# Patient Record
Sex: MALE | Race: WHITE | NOT HISPANIC OR LATINO | Employment: OTHER | URBAN - METROPOLITAN AREA
[De-identification: names, ages, dates, MRNs, and addresses within clinical notes are randomized per-mention and may not be internally consistent; named-entity substitution may affect disease eponyms.]

---

## 2019-06-21 ENCOUNTER — HOSPITAL ENCOUNTER (EMERGENCY)
Facility: HOSPITAL | Age: 79
End: 2019-06-21

## 2022-10-15 ENCOUNTER — HOSPITAL ENCOUNTER (OUTPATIENT)
Facility: HOSPITAL | Age: 82
Setting detail: OBSERVATION
Discharge: HOME | End: 2022-10-18
Attending: EMERGENCY MEDICINE | Admitting: INTERNAL MEDICINE

## 2022-10-15 ENCOUNTER — APPOINTMENT (OUTPATIENT)
Dept: CT IMAGING | Facility: HOSPITAL | Age: 82
End: 2022-10-15

## 2022-10-15 DIAGNOSIS — R77.8 ELEVATED TROPONIN: ICD-10-CM

## 2022-10-15 DIAGNOSIS — R61 DIAPHORESIS: ICD-10-CM

## 2022-10-15 DIAGNOSIS — E87.20 LACTIC ACIDOSIS: ICD-10-CM

## 2022-10-15 DIAGNOSIS — R11.2 NAUSEA AND VOMITING, UNSPECIFIED VOMITING TYPE: Primary | ICD-10-CM

## 2022-10-15 DIAGNOSIS — R10.9 FLANK PAIN: ICD-10-CM

## 2022-10-15 PROBLEM — E11.9 TYPE 2 DIABETES MELLITUS: Status: ACTIVE | Noted: 2022-10-15

## 2022-10-15 PROBLEM — I10 ESSENTIAL HYPERTENSION: Status: ACTIVE | Noted: 2022-10-15

## 2022-10-15 PROBLEM — E78.5 HYPERLIPIDEMIA: Status: ACTIVE | Noted: 2022-10-15

## 2022-10-15 LAB
ALBUMIN SERPL-MCNC: 4.3 G/DL (ref 3.5–5.2)
ALBUMIN/GLOB SERPL: 1.7 G/DL
ALP SERPL-CCNC: 108 U/L (ref 39–117)
ALT SERPL W P-5'-P-CCNC: 15 U/L (ref 1–41)
ANION GAP SERPL CALCULATED.3IONS-SCNC: 13 MMOL/L (ref 5–15)
AST SERPL-CCNC: 26 U/L (ref 1–40)
BACTERIA UR QL AUTO: NORMAL /HPF
BASOPHILS # BLD AUTO: 0.01 10*3/MM3 (ref 0–0.2)
BASOPHILS NFR BLD AUTO: 0.1 % (ref 0–1.5)
BILIRUB SERPL-MCNC: 0.5 MG/DL (ref 0–1.2)
BILIRUB UR QL STRIP: NEGATIVE
BUN SERPL-MCNC: 30 MG/DL (ref 8–23)
BUN/CREAT SERPL: 24.2 (ref 7–25)
CALCIUM SPEC-SCNC: 10.4 MG/DL (ref 8.6–10.5)
CHLORIDE SERPL-SCNC: 106 MMOL/L (ref 98–107)
CLARITY UR: CLEAR
CO2 SERPL-SCNC: 23 MMOL/L (ref 22–29)
COD CRY URNS QL: NORMAL /HPF
COLOR UR: ABNORMAL
CREAT SERPL-MCNC: 1.24 MG/DL (ref 0.76–1.27)
D-LACTATE SERPL-SCNC: 2.7 MMOL/L (ref 0.5–2)
D-LACTATE SERPL-SCNC: 3.8 MMOL/L (ref 0.5–2)
DEPRECATED RDW RBC AUTO: 47.7 FL (ref 37–54)
EGFRCR SERPLBLD CKD-EPI 2021: 58.8 ML/MIN/1.73
EOSINOPHIL # BLD AUTO: 0.07 10*3/MM3 (ref 0–0.4)
EOSINOPHIL NFR BLD AUTO: 0.5 % (ref 0.3–6.2)
ERYTHROCYTE [DISTWIDTH] IN BLOOD BY AUTOMATED COUNT: 14.8 % (ref 12.3–15.4)
FLUAV SUBTYP SPEC NAA+PROBE: NOT DETECTED
FLUBV RNA ISLT QL NAA+PROBE: NOT DETECTED
GLOBULIN UR ELPH-MCNC: 2.6 GM/DL
GLUCOSE SERPL-MCNC: 132 MG/DL (ref 65–99)
GLUCOSE UR STRIP-MCNC: NEGATIVE MG/DL
HCT VFR BLD AUTO: 34.3 % (ref 37.5–51)
HGB BLD-MCNC: 11.1 G/DL (ref 13–17.7)
HGB UR QL STRIP.AUTO: NEGATIVE
HYALINE CASTS UR QL AUTO: NORMAL /LPF
IMM GRANULOCYTES # BLD AUTO: 0.06 10*3/MM3 (ref 0–0.05)
IMM GRANULOCYTES NFR BLD AUTO: 0.4 % (ref 0–0.5)
KETONES UR QL STRIP: ABNORMAL
LEUKOCYTE ESTERASE UR QL STRIP.AUTO: ABNORMAL
LIPASE SERPL-CCNC: 19 U/L (ref 13–60)
LYMPHOCYTES # BLD AUTO: 0.6 10*3/MM3 (ref 0.7–3.1)
LYMPHOCYTES NFR BLD AUTO: 4.1 % (ref 19.6–45.3)
MCH RBC QN AUTO: 28.8 PG (ref 26.6–33)
MCHC RBC AUTO-ENTMCNC: 32.4 G/DL (ref 31.5–35.7)
MCV RBC AUTO: 89.1 FL (ref 79–97)
MONOCYTES # BLD AUTO: 0.99 10*3/MM3 (ref 0.1–0.9)
MONOCYTES NFR BLD AUTO: 6.8 % (ref 5–12)
NEUTROPHILS NFR BLD AUTO: 12.82 10*3/MM3 (ref 1.7–7)
NEUTROPHILS NFR BLD AUTO: 88.1 % (ref 42.7–76)
NITRITE UR QL STRIP: NEGATIVE
NRBC BLD AUTO-RTO: 0 /100 WBC (ref 0–0.2)
PH UR STRIP.AUTO: <=5 [PH] (ref 5–8)
PLATELET # BLD AUTO: 152 10*3/MM3 (ref 140–450)
PMV BLD AUTO: 11 FL (ref 6–12)
POTASSIUM SERPL-SCNC: 4.5 MMOL/L (ref 3.5–5.2)
PROT SERPL-MCNC: 6.9 G/DL (ref 6–8.5)
PROT UR QL STRIP: ABNORMAL
RBC # BLD AUTO: 3.85 10*6/MM3 (ref 4.14–5.8)
RBC # UR STRIP: NORMAL /HPF
REF LAB TEST METHOD: NORMAL
SARS-COV-2 RNA PNL SPEC NAA+PROBE: NOT DETECTED
SODIUM SERPL-SCNC: 142 MMOL/L (ref 136–145)
SP GR UR STRIP: 1.02 (ref 1–1.03)
SQUAMOUS #/AREA URNS HPF: NORMAL /HPF
TROPONIN T SERPL-MCNC: 0.03 NG/ML (ref 0–0.03)
TROPONIN T SERPL-MCNC: 0.03 NG/ML (ref 0–0.03)
UROBILINOGEN UR QL STRIP: ABNORMAL
WBC # UR STRIP: NORMAL /HPF
WBC NRBC COR # BLD: 14.55 10*3/MM3 (ref 3.4–10.8)

## 2022-10-15 PROCEDURE — 87636 SARSCOV2 & INF A&B AMP PRB: CPT | Performed by: PHYSICIAN ASSISTANT

## 2022-10-15 PROCEDURE — 96375 TX/PRO/DX INJ NEW DRUG ADDON: CPT

## 2022-10-15 PROCEDURE — 36415 COLL VENOUS BLD VENIPUNCTURE: CPT

## 2022-10-15 PROCEDURE — 74176 CT ABD & PELVIS W/O CONTRAST: CPT

## 2022-10-15 PROCEDURE — 96374 THER/PROPH/DIAG INJ IV PUSH: CPT

## 2022-10-15 PROCEDURE — 81001 URINALYSIS AUTO W/SCOPE: CPT | Performed by: PHYSICIAN ASSISTANT

## 2022-10-15 PROCEDURE — 80053 COMPREHEN METABOLIC PANEL: CPT | Performed by: PHYSICIAN ASSISTANT

## 2022-10-15 PROCEDURE — 93005 ELECTROCARDIOGRAM TRACING: CPT | Performed by: PHYSICIAN ASSISTANT

## 2022-10-15 PROCEDURE — C9803 HOPD COVID-19 SPEC COLLECT: HCPCS

## 2022-10-15 PROCEDURE — 85025 COMPLETE CBC W/AUTO DIFF WBC: CPT | Performed by: PHYSICIAN ASSISTANT

## 2022-10-15 PROCEDURE — 83690 ASSAY OF LIPASE: CPT | Performed by: PHYSICIAN ASSISTANT

## 2022-10-15 PROCEDURE — 83605 ASSAY OF LACTIC ACID: CPT | Performed by: PHYSICIAN ASSISTANT

## 2022-10-15 PROCEDURE — 99284 EMERGENCY DEPT VISIT MOD MDM: CPT

## 2022-10-15 PROCEDURE — 99218 PR INITIAL OBSERVATION CARE/DAY 30 MINUTES: CPT | Performed by: INTERNAL MEDICINE

## 2022-10-15 PROCEDURE — 84484 ASSAY OF TROPONIN QUANT: CPT | Performed by: PHYSICIAN ASSISTANT

## 2022-10-15 PROCEDURE — 25010000002 ONDANSETRON PER 1 MG: Performed by: PHYSICIAN ASSISTANT

## 2022-10-15 RX ORDER — SODIUM CHLORIDE 0.9 % (FLUSH) 0.9 %
10 SYRINGE (ML) INJECTION EVERY 12 HOURS SCHEDULED
Status: DISCONTINUED | OUTPATIENT
Start: 2022-10-15 | End: 2022-10-18 | Stop reason: HOSPADM

## 2022-10-15 RX ORDER — CHOLECALCIFEROL (VITAMIN D3) 125 MCG
5 CAPSULE ORAL NIGHTLY PRN
Status: DISCONTINUED | OUTPATIENT
Start: 2022-10-15 | End: 2022-10-18 | Stop reason: HOSPADM

## 2022-10-15 RX ORDER — SODIUM CHLORIDE 0.9 % (FLUSH) 0.9 %
10 SYRINGE (ML) INJECTION AS NEEDED
Status: DISCONTINUED | OUTPATIENT
Start: 2022-10-15 | End: 2022-10-18 | Stop reason: HOSPADM

## 2022-10-15 RX ORDER — ONDANSETRON 2 MG/ML
4 INJECTION INTRAMUSCULAR; INTRAVENOUS EVERY 6 HOURS PRN
Status: DISCONTINUED | OUTPATIENT
Start: 2022-10-15 | End: 2022-10-18 | Stop reason: HOSPADM

## 2022-10-15 RX ORDER — ONDANSETRON 2 MG/ML
4 INJECTION INTRAMUSCULAR; INTRAVENOUS ONCE
Status: COMPLETED | OUTPATIENT
Start: 2022-10-15 | End: 2022-10-15

## 2022-10-15 RX ORDER — HYDROCODONE BITARTRATE AND ACETAMINOPHEN 5; 325 MG/1; MG/1
1 TABLET ORAL EVERY 4 HOURS PRN
Status: DISCONTINUED | OUTPATIENT
Start: 2022-10-15 | End: 2022-10-18 | Stop reason: HOSPADM

## 2022-10-15 RX ORDER — ACETAMINOPHEN 325 MG/1
650 TABLET ORAL EVERY 4 HOURS PRN
Status: DISCONTINUED | OUTPATIENT
Start: 2022-10-15 | End: 2022-10-18 | Stop reason: HOSPADM

## 2022-10-15 RX ADMIN — SODIUM CHLORIDE 1000 ML: 9 INJECTION, SOLUTION INTRAVENOUS at 21:17

## 2022-10-15 RX ADMIN — SODIUM CHLORIDE 1000 ML: 9 INJECTION, SOLUTION INTRAVENOUS at 22:57

## 2022-10-15 RX ADMIN — ONDANSETRON 4 MG: 2 INJECTION INTRAMUSCULAR; INTRAVENOUS at 21:17

## 2022-10-16 ENCOUNTER — APPOINTMENT (OUTPATIENT)
Dept: CARDIOLOGY | Facility: HOSPITAL | Age: 82
End: 2022-10-16

## 2022-10-16 PROBLEM — R79.89 ELEVATED TROPONIN: Status: ACTIVE | Noted: 2022-10-16

## 2022-10-16 PROBLEM — I48.91 ATRIAL FIBRILLATION (HCC): Chronic | Status: ACTIVE | Noted: 2022-10-16

## 2022-10-16 PROBLEM — R77.8 ELEVATED TROPONIN: Status: ACTIVE | Noted: 2022-10-16

## 2022-10-16 PROBLEM — D64.9 ANEMIA: Status: ACTIVE | Noted: 2022-10-16

## 2022-10-16 PROBLEM — R00.1 JUNCTIONAL BRADYCARDIA: Status: ACTIVE | Noted: 2022-10-16

## 2022-10-16 LAB
ALBUMIN SERPL-MCNC: 3.4 G/DL (ref 3.5–5.2)
ALBUMIN/GLOB SERPL: 1.6 G/DL
ALP SERPL-CCNC: 81 U/L (ref 39–117)
ALT SERPL W P-5'-P-CCNC: 11 U/L (ref 1–41)
ANION GAP SERPL CALCULATED.3IONS-SCNC: 10 MMOL/L (ref 5–15)
AST SERPL-CCNC: 17 U/L (ref 1–40)
BASOPHILS # BLD AUTO: 0.01 10*3/MM3 (ref 0–0.2)
BASOPHILS NFR BLD AUTO: 0.1 % (ref 0–1.5)
BH CV ECHO MEAS - AO MAX PG: 11.1 MMHG
BH CV ECHO MEAS - AO MEAN PG: 5.9 MMHG
BH CV ECHO MEAS - AO ROOT DIAM: 2.9 CM
BH CV ECHO MEAS - AO V2 MAX: 166.8 CM/SEC
BH CV ECHO MEAS - AO V2 VTI: 33.2 CM
BH CV ECHO MEAS - AVA(I,D): 1.99 CM2
BH CV ECHO MEAS - EDV(CUBED): 89.9 ML
BH CV ECHO MEAS - EDV(MOD-SP2): 51.7 ML
BH CV ECHO MEAS - EDV(MOD-SP4): 83.2 ML
BH CV ECHO MEAS - EF(MOD-BP): 59 %
BH CV ECHO MEAS - EF(MOD-SP2): 54.4 %
BH CV ECHO MEAS - EF(MOD-SP4): 58.2 %
BH CV ECHO MEAS - ESV(CUBED): 26.5 ML
BH CV ECHO MEAS - ESV(MOD-SP2): 23.6 ML
BH CV ECHO MEAS - ESV(MOD-SP4): 34.8 ML
BH CV ECHO MEAS - FS: 33.5 %
BH CV ECHO MEAS - IVS/LVPW: 0.98 CM
BH CV ECHO MEAS - IVSD: 0.98 CM
BH CV ECHO MEAS - LA DIMENSION: 3.9 CM
BH CV ECHO MEAS - LAT PEAK E' VEL: 12.2 CM/SEC
BH CV ECHO MEAS - LV MASS(C)D: 150 GRAMS
BH CV ECHO MEAS - LV MAX PG: 4.3 MMHG
BH CV ECHO MEAS - LV MEAN PG: 2.13 MMHG
BH CV ECHO MEAS - LV V1 MAX: 103.7 CM/SEC
BH CV ECHO MEAS - LV V1 VTI: 21.1 CM
BH CV ECHO MEAS - LVIDD: 4.5 CM
BH CV ECHO MEAS - LVIDS: 3 CM
BH CV ECHO MEAS - LVOT AREA: 3.1 CM2
BH CV ECHO MEAS - LVOT DIAM: 2 CM
BH CV ECHO MEAS - LVPWD: 1 CM
BH CV ECHO MEAS - MED PEAK E' VEL: 7.3 CM/SEC
BH CV ECHO MEAS - MV DEC SLOPE: 839.5 CM/SEC2
BH CV ECHO MEAS - MV DEC TIME: 0.21 MSEC
BH CV ECHO MEAS - MV E MAX VEL: 95.8 CM/SEC
BH CV ECHO MEAS - MV MAX PG: 7.7 MMHG
BH CV ECHO MEAS - MV MEAN PG: 1.68 MMHG
BH CV ECHO MEAS - MV P1/2T: 46.9 MSEC
BH CV ECHO MEAS - MV V2 VTI: 24.8 CM
BH CV ECHO MEAS - MVA(P1/2T): 4.7 CM2
BH CV ECHO MEAS - MVA(VTI): 2.7 CM2
BH CV ECHO MEAS - PA ACC TIME: 0.14 SEC
BH CV ECHO MEAS - PA PR(ACCEL): 15.6 MMHG
BH CV ECHO MEAS - SV(LVOT): 66.2 ML
BH CV ECHO MEAS - SV(MOD-SP2): 28.1 ML
BH CV ECHO MEAS - SV(MOD-SP4): 48.4 ML
BH CV ECHO MEAS - TAPSE (>1.6): 2.7 CM
BH CV ECHO MEASUREMENTS AVERAGE E/E' RATIO: 9.83
BH CV XLRA - RV BASE: 3.9 CM
BH CV XLRA - RV LENGTH: 7.2 CM
BH CV XLRA - RV MID: 3.1 CM
BH CV XLRA - TDI S': 11.3 CM/SEC
BILIRUB SERPL-MCNC: 0.4 MG/DL (ref 0–1.2)
BUN SERPL-MCNC: 30 MG/DL (ref 8–23)
BUN/CREAT SERPL: 23.4 (ref 7–25)
CALCIUM SPEC-SCNC: 9.3 MG/DL (ref 8.6–10.5)
CHLORIDE SERPL-SCNC: 110 MMOL/L (ref 98–107)
CHOLEST SERPL-MCNC: 71 MG/DL (ref 0–200)
CO2 SERPL-SCNC: 23 MMOL/L (ref 22–29)
CREAT SERPL-MCNC: 1.28 MG/DL (ref 0.76–1.27)
D-LACTATE SERPL-SCNC: 1.7 MMOL/L (ref 0.5–2)
DEPRECATED RDW RBC AUTO: 48.1 FL (ref 37–54)
EGFRCR SERPLBLD CKD-EPI 2021: 56.6 ML/MIN/1.73
EOSINOPHIL # BLD AUTO: 0.03 10*3/MM3 (ref 0–0.4)
EOSINOPHIL NFR BLD AUTO: 0.3 % (ref 0.3–6.2)
ERYTHROCYTE [DISTWIDTH] IN BLOOD BY AUTOMATED COUNT: 14.7 % (ref 12.3–15.4)
GLOBULIN UR ELPH-MCNC: 2.1 GM/DL
GLUCOSE BLDC GLUCOMTR-MCNC: 120 MG/DL (ref 70–130)
GLUCOSE BLDC GLUCOMTR-MCNC: 159 MG/DL (ref 70–130)
GLUCOSE BLDC GLUCOMTR-MCNC: 161 MG/DL (ref 70–130)
GLUCOSE BLDC GLUCOMTR-MCNC: 170 MG/DL (ref 70–130)
GLUCOSE SERPL-MCNC: 155 MG/DL (ref 65–99)
HBA1C MFR BLD: 6 % (ref 4.8–5.6)
HCT VFR BLD AUTO: 26.3 % (ref 37.5–51)
HDLC SERPL-MCNC: 36 MG/DL (ref 40–60)
HGB BLD-MCNC: 8.5 G/DL (ref 13–17.7)
IMM GRANULOCYTES # BLD AUTO: 0.05 10*3/MM3 (ref 0–0.05)
IMM GRANULOCYTES NFR BLD AUTO: 0.5 % (ref 0–0.5)
LDLC SERPL CALC-MCNC: 22 MG/DL (ref 0–100)
LDLC/HDLC SERPL: 0.71 {RATIO}
LV EF 2D ECHO EST: 60 %
LYMPHOCYTES # BLD AUTO: 0.68 10*3/MM3 (ref 0.7–3.1)
LYMPHOCYTES NFR BLD AUTO: 7.4 % (ref 19.6–45.3)
MAGNESIUM SERPL-MCNC: 1.3 MG/DL (ref 1.6–2.4)
MAXIMAL PREDICTED HEART RATE: 140 BPM
MCH RBC QN AUTO: 29.1 PG (ref 26.6–33)
MCHC RBC AUTO-ENTMCNC: 32.3 G/DL (ref 31.5–35.7)
MCV RBC AUTO: 90.1 FL (ref 79–97)
MONOCYTES # BLD AUTO: 0.75 10*3/MM3 (ref 0.1–0.9)
MONOCYTES NFR BLD AUTO: 8.2 % (ref 5–12)
NEUTROPHILS NFR BLD AUTO: 7.62 10*3/MM3 (ref 1.7–7)
NEUTROPHILS NFR BLD AUTO: 83.5 % (ref 42.7–76)
NRBC BLD AUTO-RTO: 0 /100 WBC (ref 0–0.2)
PLATELET # BLD AUTO: 117 10*3/MM3 (ref 140–450)
PMV BLD AUTO: 11.6 FL (ref 6–12)
POTASSIUM SERPL-SCNC: 4 MMOL/L (ref 3.5–5.2)
PROT SERPL-MCNC: 5.5 G/DL (ref 6–8.5)
QT INTERVAL: 398 MS
QT INTERVAL: 520 MS
QTC INTERVAL: 441 MS
QTC INTERVAL: 607 MS
RBC # BLD AUTO: 2.92 10*6/MM3 (ref 4.14–5.8)
SODIUM SERPL-SCNC: 143 MMOL/L (ref 136–145)
STRESS TARGET HR: 119 BPM
TRIGL SERPL-MCNC: 47 MG/DL (ref 0–150)
TROPONIN T SERPL-MCNC: 0.03 NG/ML (ref 0–0.03)
TROPONIN T SERPL-MCNC: 0.03 NG/ML (ref 0–0.03)
TSH SERPL DL<=0.05 MIU/L-ACNC: 1.51 UIU/ML (ref 0.27–4.2)
VLDLC SERPL-MCNC: 13 MG/DL (ref 5–40)
WBC NRBC COR # BLD: 9.14 10*3/MM3 (ref 3.4–10.8)

## 2022-10-16 PROCEDURE — 83735 ASSAY OF MAGNESIUM: CPT | Performed by: INTERNAL MEDICINE

## 2022-10-16 PROCEDURE — 84443 ASSAY THYROID STIM HORMONE: CPT | Performed by: INTERNAL MEDICINE

## 2022-10-16 PROCEDURE — 83036 HEMOGLOBIN GLYCOSYLATED A1C: CPT | Performed by: PHYSICIAN ASSISTANT

## 2022-10-16 PROCEDURE — 96361 HYDRATE IV INFUSION ADD-ON: CPT

## 2022-10-16 PROCEDURE — 93306 TTE W/DOPPLER COMPLETE: CPT | Performed by: INTERNAL MEDICINE

## 2022-10-16 PROCEDURE — 82962 GLUCOSE BLOOD TEST: CPT

## 2022-10-16 PROCEDURE — 80053 COMPREHEN METABOLIC PANEL: CPT | Performed by: INTERNAL MEDICINE

## 2022-10-16 PROCEDURE — 96365 THER/PROPH/DIAG IV INF INIT: CPT

## 2022-10-16 PROCEDURE — G0378 HOSPITAL OBSERVATION PER HR: HCPCS

## 2022-10-16 PROCEDURE — 80061 LIPID PANEL: CPT | Performed by: INTERNAL MEDICINE

## 2022-10-16 PROCEDURE — 25010000002 MAGNESIUM SULFATE 2 GM/50ML SOLUTION: Performed by: FAMILY MEDICINE

## 2022-10-16 PROCEDURE — 85025 COMPLETE CBC W/AUTO DIFF WBC: CPT | Performed by: INTERNAL MEDICINE

## 2022-10-16 PROCEDURE — 96366 THER/PROPH/DIAG IV INF ADDON: CPT

## 2022-10-16 PROCEDURE — 93005 ELECTROCARDIOGRAM TRACING: CPT | Performed by: FAMILY MEDICINE

## 2022-10-16 PROCEDURE — 83605 ASSAY OF LACTIC ACID: CPT | Performed by: PHYSICIAN ASSISTANT

## 2022-10-16 PROCEDURE — 99226 PR SBSQ OBSERVATION CARE/DAY 35 MINUTES: CPT | Performed by: FAMILY MEDICINE

## 2022-10-16 PROCEDURE — 84484 ASSAY OF TROPONIN QUANT: CPT | Performed by: INTERNAL MEDICINE

## 2022-10-16 PROCEDURE — 63710000001 INSULIN LISPRO (HUMAN) PER 5 UNITS: Performed by: PHYSICIAN ASSISTANT

## 2022-10-16 PROCEDURE — 99203 OFFICE O/P NEW LOW 30 MIN: CPT | Performed by: PHYSICIAN ASSISTANT

## 2022-10-16 PROCEDURE — 93306 TTE W/DOPPLER COMPLETE: CPT

## 2022-10-16 RX ORDER — INSULIN LISPRO 100 [IU]/ML
0-9 INJECTION, SOLUTION INTRAVENOUS; SUBCUTANEOUS
Status: DISCONTINUED | OUTPATIENT
Start: 2022-10-16 | End: 2022-10-18 | Stop reason: HOSPADM

## 2022-10-16 RX ORDER — SODIUM CHLORIDE, SODIUM LACTATE, POTASSIUM CHLORIDE, CALCIUM CHLORIDE 600; 310; 30; 20 MG/100ML; MG/100ML; MG/100ML; MG/100ML
100 INJECTION, SOLUTION INTRAVENOUS CONTINUOUS
Status: ACTIVE | OUTPATIENT
Start: 2022-10-16 | End: 2022-10-16

## 2022-10-16 RX ORDER — GLIPIZIDE 5 MG/1
5 TABLET, FILM COATED, EXTENDED RELEASE ORAL DAILY
COMMUNITY

## 2022-10-16 RX ORDER — LISINOPRIL 40 MG/1
30 TABLET ORAL DAILY
COMMUNITY

## 2022-10-16 RX ORDER — MAGNESIUM SULFATE HEPTAHYDRATE 40 MG/ML
2 INJECTION, SOLUTION INTRAVENOUS AS NEEDED
Status: DISCONTINUED | OUTPATIENT
Start: 2022-10-16 | End: 2022-10-18 | Stop reason: HOSPADM

## 2022-10-16 RX ORDER — DEXTROSE MONOHYDRATE 25 G/50ML
25 INJECTION, SOLUTION INTRAVENOUS
Status: DISCONTINUED | OUTPATIENT
Start: 2022-10-16 | End: 2022-10-18 | Stop reason: HOSPADM

## 2022-10-16 RX ORDER — MAGNESIUM SULFATE HEPTAHYDRATE 40 MG/ML
4 INJECTION, SOLUTION INTRAVENOUS AS NEEDED
Status: DISCONTINUED | OUTPATIENT
Start: 2022-10-16 | End: 2022-10-18 | Stop reason: HOSPADM

## 2022-10-16 RX ORDER — NICOTINE POLACRILEX 4 MG
15 LOZENGE BUCCAL
Status: DISCONTINUED | OUTPATIENT
Start: 2022-10-16 | End: 2022-10-18 | Stop reason: HOSPADM

## 2022-10-16 RX ADMIN — INSULIN LISPRO 2 UNITS: 100 INJECTION, SOLUTION INTRAVENOUS; SUBCUTANEOUS at 12:02

## 2022-10-16 RX ADMIN — MAGNESIUM SULFATE HEPTAHYDRATE 2 G: 2 INJECTION, SOLUTION INTRAVENOUS at 15:12

## 2022-10-16 RX ADMIN — Medication 10 ML: at 09:23

## 2022-10-16 RX ADMIN — SODIUM CHLORIDE, POTASSIUM CHLORIDE, SODIUM LACTATE AND CALCIUM CHLORIDE 100 ML/HR: 600; 310; 30; 20 INJECTION, SOLUTION INTRAVENOUS at 00:57

## 2022-10-16 RX ADMIN — SODIUM CHLORIDE, POTASSIUM CHLORIDE, SODIUM LACTATE AND CALCIUM CHLORIDE 100 ML/HR: 600; 310; 30; 20 INJECTION, SOLUTION INTRAVENOUS at 10:21

## 2022-10-16 RX ADMIN — Medication 10 ML: at 00:57

## 2022-10-16 RX ADMIN — Medication 10 ML: at 23:51

## 2022-10-16 RX ADMIN — INSULIN LISPRO 2 UNITS: 100 INJECTION, SOLUTION INTRAVENOUS; SUBCUTANEOUS at 17:08

## 2022-10-16 RX ADMIN — INSULIN LISPRO 2 UNITS: 100 INJECTION, SOLUTION INTRAVENOUS; SUBCUTANEOUS at 09:22

## 2022-10-16 RX ADMIN — MAGNESIUM SULFATE HEPTAHYDRATE 2 G: 2 INJECTION, SOLUTION INTRAVENOUS at 10:22

## 2022-10-16 RX ADMIN — MAGNESIUM SULFATE HEPTAHYDRATE 2 G: 2 INJECTION, SOLUTION INTRAVENOUS at 11:52

## 2022-10-16 NOTE — CONSULTS
[facility]         Date of Hospital Visit: 10/16/22      Place of Service: [facility]    Patient Name: August Alex  :1940    Referral Provider: Hospitalist  Primary Care Provider: Candelario Ayon MD      Chief complaint/Reason for Consultation:  Elevated troponin and afib         Problem List:  Active Hospital Problems    Diagnosis  POA   • **Nausea and vomiting, unspecified vomiting type [R11.2]  Yes   • Elevated troponin [R77.8]  Yes     Priority: High   • Atrial fibrillation (HCC) [I48.91]  Yes     Priority: Medium   • Essential hypertension [I10]  Yes     Priority: Low   • Hyperlipidemia [E78.5]  Yes     Priority: Low   • Anemia [D64.9]  Yes   • Type 2 diabetes mellitus (HCC) [E11.9]  Yes           History of Present Illness:  This is an 82-year-old hypertensive, dyslipidemic, diabetic male whose cardiac history is significant for atrial fibrillation.  He is followed by cardiology at the [facility].  He has a history of remote cardioversion to sinus rhythm.  He was admitted to [facility] yesterday with severe nausea vomiting and diarrhea.  He has been admitted to the hospitalist service.  His ER evaluation has revealed a mildly elevated troponin in the absence of chest pain.  He has no awareness of tachyarrhythmias, denies dizziness or syncope.  His blood pressure at home runs between 120 to 140 mmHg.  He states his heart rate is typically in the mid 40s.  He states he has had a recent Holter monitor with no significant findings.  He has no known history of vascular disease, no history of CHF, no history of TIA or CVA.  He is anticoagulated.  Denies any recent exertional chest pain or dyspnea, denies orthopnea, PND, claudication, lower extremity edema.          No past surgical history on file.    No Known Allergies    Current Outpatient Medications   Medication Instructions   • apixaban (ELIQUIS) 5 mg, Oral, 2 Times Daily   • glipizide (GLUCOTROL  "XL) 5 mg, Oral, Daily   • lisinopril (PRINIVIL,ZESTRIL) 30 mg, Oral, Daily          Scheduled Meds:insulin lispro, 0-9 Units, Subcutaneous, TID AC  sodium chloride, 10 mL, Intravenous, Q12H      Continuous Infusions:lactated ringers, 100 mL/hr, Last Rate: 100 mL/hr (10/16/22 1021)            Social History     Socioeconomic History   • Marital status: Single   Tobacco Use   • Smoking status: Never     Passive exposure: Past   • Smokeless tobacco: Never   Vaping Use   • Vaping Use: Never used   Substance and Sexual Activity   • Alcohol use: Never   • Drug use: Never   • Sexual activity: Defer       Family History   Problem Relation Age of Onset   • Hyperlipidemia Father    • Heart disease Father        REVIEW OF SYSTEMS:   Review of Systems   Constitutional: Negative.   HENT: Negative.    Eyes: Negative.    Respiratory: Negative.    Endocrine: Negative.    Hematologic/Lymphatic: Negative.    Skin: Negative.    Musculoskeletal: Negative.    Gastrointestinal: Positive for diarrhea, nausea and vomiting.   Genitourinary: Negative.    Neurological: Negative.    Psychiatric/Behavioral: Negative.    Allergic/Immunologic: Negative.    All other systems reviewed and are negative.           Objective:  Vitals:    10/15/22 2023 10/16/22 0030 10/16/22 0325 10/16/22 0900   BP: 129/69 165/71 114/52 136/56   BP Location: Right arm Right arm Left arm Left arm   Patient Position: Lying Lying Lying Lying   Pulse: 93 85 65 61   Resp: 18 18 18 18   Temp: 97.6 °F (36.4 °C) 98.1 °F (36.7 °C) 98.8 °F (37.1 °C) 98.6 °F (37 °C)   TempSrc: Oral Oral Oral Oral   SpO2: 99% 95% 98% 97%   Weight: 63.5 kg (140 lb) 64.4 kg (142 lb)     Height: 175.3 cm (69\")        Body mass index is 20.97 kg/m².  Flowsheet Rows    Flowsheet Row First Filed Value   Admission Height 175.3 cm (69\") Documented at 10/15/2022 2023   Admission Weight 63.5 kg (140 lb) Documented at 10/15/2022 2023          Intake/Output Summary (Last 24 hours) at 10/16/2022 1149  Last " data filed at 10/16/2022 1022  Gross per 24 hour   Intake 1490 ml   Output --   Net 1490 ml       Vitals and nursing note reviewed.   Constitutional:       Appearance: Healthy appearance. Not in distress.   Eyes:      Conjunctiva/sclera: Conjunctivae normal.      Pupils: Pupils are equal, round, and reactive to light.   Neck:      Vascular: JVD normal.   Pulmonary:      Effort: Pulmonary effort is normal.      Breath sounds: Normal breath sounds.   Chest:      Chest wall: Not tender to palpatation.   Cardiovascular:      Normal rate. Occasional ectopic beats. Regular rhythm.      Murmurs: There is no murmur.      No gallop. No click. No rub.   Pulses:     Intact distal pulses.   Edema:     Peripheral edema absent.   Abdominal:      General: Bowel sounds are normal.      Palpations: Abdomen is soft.   Musculoskeletal: Normal range of motion.         General: No deformity.      Cervical back: Normal range of motion. Skin:     General: Skin is warm and dry.   Neurological:      General: No focal deficit present.      Mental Status: Alert.         Lab Review:                CBC:      Lab 10/16/22  0206 10/15/22  2113   WBC 9.14 14.55*   HEMOGLOBIN 8.5* 11.1*   HEMATOCRIT 26.3* 34.3*   PLATELETS 117* 152   NEUTROS ABS 7.62* 12.82*   IMMATURE GRANS (ABS) 0.05 0.06*   LYMPHS ABS 0.68* 0.60*   MONOS ABS 0.75 0.99*   EOS ABS 0.03 0.07   MCV 90.1 89.1     CMP:      Lab 10/16/22  0206 10/15/22  2145   SODIUM 143 142   POTASSIUM 4.0 4.5   CHLORIDE 110* 106   CO2 23.0 23.0   ANION GAP 10.0 13.0   BUN 30* 30*   CREATININE 1.28* 1.24   EGFR 56.6* 58.8*   GLUCOSE 155* 132*   CALCIUM 9.3 10.4   MAGNESIUM 1.3*  --    TOTAL PROTEIN 5.5* 6.9   ALBUMIN 3.40* 4.30   GLOBULIN 2.1 2.6   ALT (SGPT) 11 15   AST (SGOT) 17 26   BILIRUBIN 0.4 0.5   ALK PHOS 81 108   LIPASE  --  19     Results from last 7 days   Lab Units 10/16/22  0206   MAGNESIUM mg/dL 1.3*     Lab Results   Component Value Date    HGBA1C 6.00 (H) 10/16/2022     Estimated  Creatinine Clearance: 41.9 mL/min (A) (by C-G formula based on SCr of 1.28 mg/dL (H)).  No results found for: DDIMER          CARDIAC LABS:      Lab 10/16/22  0517 10/16/22  0206 10/15/22  2234 10/15/22  2145   TROPONIN T 0.033* 0.034* 0.033* 0.029       Lab Results   Component Value Date    CHOL 71 10/16/2022    TRIG 47 10/16/2022    HDL 36 (L) 10/16/2022    LDL 22 10/16/2022           EKG:         CHADS-VASc Risk Assessment            4 Total Score    1 Hypertension    2 Age >/= 75    1 DM        Criteria that do not apply:    CHF    PRIOR STROKE/TIA/THROMBO    Vascular Disease    Age 65-74    Sex: Female              Result Review:  I have personally reviewed the results from the time of admission and agree with these findings:  [x]  Laboratory  [x]  Radiology  [x]  EKG/Telemetry   []  Pathology  [x]  Old records  []  Other:        Assessment and Plan:      1.  Atrial fibrillation with controlled ventricular rate   -Chronically anticoagulated   -No indication for rate management    2.  Mildly elevated troponin   -No anginal symptoms   -Likely secondary to cardiac stress in the setting of acute gastroenteritis   -Echocardiogram pending    3.  Hypertension   -Well managed on current medical regimen      4.  Gastroenteritis   -Per hospitalist service         Electronically signed by ARNOLD Mcfadden, 10/16/22, 12:25 PM EDT.    _________________________      Careful review suggests asymptomatic mild sinus bradycardia.  Recently evaluated by his cardiologist at [facility].  Their clinical decision was to monitor and no indication for a PPM at this time.    Shukri Silva, DO, FACC, RS  Cardiac Electrophysiologist  [facility] / [facility]

## 2022-10-16 NOTE — ED PROVIDER NOTES
Subjective   History of Present Illness  Mr. Alex is an 82-year-old male who presents to the emergency department with complaints of abrupt onset of nausea, vomiting and diarrhea while watching TV around 5 PM today.  He notes some right flank and lower back pain as well but then states he has chronic back pain.  He also has some mild dysuria.  No fever but he has been chilling this evening.  No chest pain or shortness of breath.  No cough.   He had a colonoscopy 3 days ago and was told it was normal.  Past medical hx of IDDMII and a-fib (on Eliquis).  He has had prior cholecystectomy.  He states he ate a hamburger earlier and thought he might have food poisoning.  He denies chest pain.  He states he did get diaphoretic earlier.              Review of Systems   Constitutional: Positive for chills and diaphoresis. Negative for fever.   HENT: Negative for sore throat.    Respiratory: Negative for cough and shortness of breath.    Cardiovascular: Negative for chest pain and palpitations.   Gastrointestinal: Positive for diarrhea, nausea and vomiting. Negative for abdominal pain and blood in stool.   Genitourinary: Negative for decreased urine volume and dysuria.   Musculoskeletal: Positive for back pain (chronic lower back pain).   Skin: Negative for rash.   Neurological: Positive for weakness (generalized) and light-headedness. Negative for headaches.   Hematological: Bruises/bleeds easily (on Eliquis).   Psychiatric/Behavioral: Negative for confusion.       No past medical history on file.    No Known Allergies    No past surgical history on file.    No family history on file.    Social History     Socioeconomic History   • Marital status: Single           Objective   Physical Exam  Constitutional:       General: He is not in acute distress.     Appearance: He is well-developed. He is not toxic-appearing or diaphoretic.   HENT:      Head: Normocephalic.      Nose: Nose normal.      Mouth/Throat:      Mouth: Mucous  membranes are moist.   Eyes:      General: No scleral icterus.     Extraocular Movements: Extraocular movements intact.      Conjunctiva/sclera: Conjunctivae normal.      Pupils: Pupils are equal, round, and reactive to light.   Cardiovascular:      Rate and Rhythm: Normal rate and regular rhythm.      Heart sounds: Normal heart sounds.   Pulmonary:      Effort: Pulmonary effort is normal. No respiratory distress.      Breath sounds: Normal breath sounds.   Chest:      Chest wall: No tenderness.   Abdominal:      General: Bowel sounds are normal.      Palpations: Abdomen is soft.      Tenderness: There is no abdominal tenderness. There is no guarding or rebound.   Musculoskeletal:         General: No tenderness. Normal range of motion.      Cervical back: Normal range of motion and neck supple.      Right lower leg: No edema.      Left lower leg: No edema.   Skin:     General: Skin is warm and dry.      Coloration: Skin is not pale.      Findings: No erythema.   Neurological:      General: No focal deficit present.      Mental Status: He is alert and oriented to person, place, and time.   Psychiatric:         Mood and Affect: Mood normal.         Procedures           ED Course  ED Course as of 10/15/22 2346   Sat Oct 15, 2022   2318 Lactate(!!): 2.7 [JG]   2318 Troponin T(!!): 0.033 [JG]   2341 As directed in transition of care from Timothy Corley PA-C patient is an 82-year-old male who presents the ER following episode of acute diaphoresis, nausea, vomiting and abdominal pain.  History of insulin-dependent type 2 diabetes.  Initial lactic acid of 3.8 with repeat lactic following administration of IV fluids of 2.7.  Patient without any additional episodes of nausea and vomiting.  Initial troponin 0.029 with repeat troponin 0.033.  Remainder of labs without acute or emergent abnormalities.  UA bland.  EKG without evidence of acute ischemic changes.  CT of abdomen and pelvis demonstrates no evidence of acute disease  in the abdomen or pelvis.  As patient has slight elevation in troponin and lactic acid remains elevated at 2.7 hospitalist consulted for admission was agreeable to accept patient.  Patient agreeable to plan of care and hospitalization for further evaluation and treatment. [JG]      ED Course User Index  [JG] Harjinder Morrow PA      82-year-old male with history of diabetes and atrial fibrillation (on Eliquis) presents after a cute onset of nausea, vomiting and diarrhea this evening.  No significant abdominal pain but some right flank pain and right lower back pain.  He has had some mild dysuria but no increased frequency.  No gross hematuria.  He denies any chest pain.  He had recent colonoscopy 3 days ago that was normal.    Differential diagnosis includes viral gastroenteritis, UTI, kidney stone, MI, DKA, etc.    Labs, UA, CT abdomen pelvis and COVID swab collected.    EKG shows accelerated junctional rhythm with nonspecific T/ST abnormalities with a rate of 82.    Patient was started on IV fluids and given IV Zofran for his nausea.    Lactic acid is elevated at 3.8.  White count is up at 14.55.  Glucose is 132.  Normal bicarb and normal anion gap.  No concerning electrolytes.  Normal LFTs.  Initial troponin is 0.029.  Normal lipase.    CT abdomen pelvis without contrast was obtained for further evaluation of his right flank pain.    CT abdomen pelvis without contrast:  1. No evidence of acute disease in the abdomen or pelvis, accounting for the limitations of the noncontrast technique.  2. Nonobstructing stones in each kidney.  3. Enlarged prostate.    Patient was continued on IV fluids while awaiting reflex lactic acid.  A repeat troponin was also sent.  The patient states that he is feeling much better at this point.  COVID swab is negative.       Recent Results (from the past 24 hour(s))   COVID-19 and FLU A/B PCR - Swab, Nasopharynx    Collection Time: 10/15/22  8:53 PM    Specimen: Nasopharynx; Swab   Result  Value Ref Range    COVID19 Not Detected Not Detected - Ref. Range    Influenza A PCR Not Detected Not Detected    Influenza B PCR Not Detected Not Detected   ECG 12 Lead    Collection Time: 10/15/22  9:10 PM   Result Value Ref Range    QT Interval 520 ms    QTC Interval 607 ms   Lactic Acid, Plasma    Collection Time: 10/15/22  9:13 PM    Specimen: Blood   Result Value Ref Range    Lactate 3.8 (C) 0.5 - 2.0 mmol/L   CBC Auto Differential    Collection Time: 10/15/22  9:13 PM    Specimen: Blood   Result Value Ref Range    WBC 14.55 (H) 3.40 - 10.80 10*3/mm3    RBC 3.85 (L) 4.14 - 5.80 10*6/mm3    Hemoglobin 11.1 (L) 13.0 - 17.7 g/dL    Hematocrit 34.3 (L) 37.5 - 51.0 %    MCV 89.1 79.0 - 97.0 fL    MCH 28.8 26.6 - 33.0 pg    MCHC 32.4 31.5 - 35.7 g/dL    RDW 14.8 12.3 - 15.4 %    RDW-SD 47.7 37.0 - 54.0 fl    MPV 11.0 6.0 - 12.0 fL    Platelets 152 140 - 450 10*3/mm3    Neutrophil % 88.1 (H) 42.7 - 76.0 %    Lymphocyte % 4.1 (L) 19.6 - 45.3 %    Monocyte % 6.8 5.0 - 12.0 %    Eosinophil % 0.5 0.3 - 6.2 %    Basophil % 0.1 0.0 - 1.5 %    Immature Grans % 0.4 0.0 - 0.5 %    Neutrophils, Absolute 12.82 (H) 1.70 - 7.00 10*3/mm3    Lymphocytes, Absolute 0.60 (L) 0.70 - 3.10 10*3/mm3    Monocytes, Absolute 0.99 (H) 0.10 - 0.90 10*3/mm3    Eosinophils, Absolute 0.07 0.00 - 0.40 10*3/mm3    Basophils, Absolute 0.01 0.00 - 0.20 10*3/mm3    Immature Grans, Absolute 0.06 (H) 0.00 - 0.05 10*3/mm3    nRBC 0.0 0.0 - 0.2 /100 WBC   Comprehensive Metabolic Panel    Collection Time: 10/15/22  9:45 PM    Specimen: Blood   Result Value Ref Range    Glucose 132 (H) 65 - 99 mg/dL    BUN 30 (H) 8 - 23 mg/dL    Creatinine 1.24 0.76 - 1.27 mg/dL    Sodium 142 136 - 145 mmol/L    Potassium 4.5 3.5 - 5.2 mmol/L    Chloride 106 98 - 107 mmol/L    CO2 23.0 22.0 - 29.0 mmol/L    Calcium 10.4 8.6 - 10.5 mg/dL    Total Protein 6.9 6.0 - 8.5 g/dL    Albumin 4.30 3.50 - 5.20 g/dL    ALT (SGPT) 15 1 - 41 U/L    AST (SGOT) 26 1 - 40 U/L    Alkaline  Phosphatase 108 39 - 117 U/L    Total Bilirubin 0.5 0.0 - 1.2 mg/dL    Globulin 2.6 gm/dL    A/G Ratio 1.7 g/dL    BUN/Creatinine Ratio 24.2 7.0 - 25.0    Anion Gap 13.0 5.0 - 15.0 mmol/L    eGFR 58.8 (L) >60.0 mL/min/1.73   Lipase    Collection Time: 10/15/22  9:45 PM    Specimen: Blood   Result Value Ref Range    Lipase 19 13 - 60 U/L   Troponin    Collection Time: 10/15/22  9:45 PM    Specimen: Blood   Result Value Ref Range    Troponin T 0.029 0.000 - 0.030 ng/mL   Urinalysis With Microscopic If Indicated (No Culture) - Urine, Clean Catch    Collection Time: 10/15/22 10:29 PM    Specimen: Urine, Clean Catch   Result Value Ref Range    Color, UA Dark Yellow (A) Yellow, Straw    Appearance, UA Clear Clear    pH, UA <=5.0 5.0 - 8.0    Specific Gravity, UA 1.020 1.001 - 1.030    Glucose, UA Negative Negative    Ketones, UA 15 mg/dL (1+) (A) Negative    Bilirubin, UA Negative Negative    Blood, UA Negative Negative    Protein, UA 30 mg/dL (1+) (A) Negative    Leuk Esterase, UA Small (1+) (A) Negative    Nitrite, UA Negative Negative    Urobilinogen, UA 1.0 E.U./dL 0.2 - 1.0 E.U./dL   Urinalysis, Microscopic Only - Urine, Clean Catch    Collection Time: 10/15/22 10:29 PM    Specimen: Urine, Clean Catch   Result Value Ref Range    RBC, UA 0-2 None Seen, 0-2 /HPF    WBC, UA 0-2 None Seen, 0-2 /HPF    Bacteria, UA None Seen None Seen, Trace /HPF    Squamous Epithelial Cells, UA 0-2 None Seen, 0-2 /HPF    Hyaline Casts, UA 0-6 0 - 6 /LPF    Calcium Oxalate Crystals, UA Moderate/2+ None Seen /HPF    Methodology Manual Light Microscopy    STAT Lactic Acid, Reflex    Collection Time: 10/15/22 10:34 PM    Specimen: Blood   Result Value Ref Range    Lactate 2.7 (C) 0.5 - 2.0 mmol/L   Troponin    Collection Time: 10/15/22 10:34 PM    Specimen: Blood   Result Value Ref Range    Troponin T 0.033 (C) 0.000 - 0.030 ng/mL   ECG 12 Lead    Collection Time: 10/15/22 10:41 PM   Result Value Ref Range    QT Interval 398 ms    QTC  "Interval 441 ms     Note: In addition to lab results from this visit, the labs listed above may include labs taken at another facility or during a different encounter within the last 24 hours. Please correlate lab times with ED admission and discharge times for further clarification of the services performed during this visit.    CT Abdomen Pelvis Without Contrast   Final Result      1. No evidence of acute disease in the abdomen or pelvis, accounting for the limitations of the noncontrast technique.   2. Nonobstructing stones in each kidney.   3. Enlarged prostate.   4. Additional chronic findings as above.      Electronically signed by:  Donis Carranza M.D.     10/15/2022 7:51 PM Mountain Time        Vitals:    10/15/22 2023   BP: 129/69   BP Location: Right arm   Patient Position: Lying   Pulse: 93   Resp: 18   Temp: 97.6 °F (36.4 °C)   TempSrc: Oral   SpO2: 99%   Weight: 63.5 kg (140 lb)   Height: 175.3 cm (69\")     Medications   sodium chloride 0.9 % flush 10 mL (has no administration in time range)   sodium chloride 0.9 % bolus 1,000 mL (1,000 mL Intravenous New Bag 10/15/22 2117)   ondansetron (ZOFRAN) injection 4 mg (4 mg Intravenous Given 10/15/22 2117)   sodium chloride 0.9 % bolus 1,000 mL (1,000 mL Intravenous New Bag 10/15/22 2257)     ECG/EMG Results (last 24 hours)     Procedure Component Value Units Date/Time    ECG 12 Lead [[id number]] Collected: 10/15/22 2110     Updated: 10/15/22 2111     QT Interval 520 ms      QTC Interval 607 ms     Narrative:      Test Reason : chest pain  Blood Pressure :   */*   mmHG  Vent. Rate :  82 BPM     Atrial Rate :  77 BPM     P-R Int :   * ms          QRS Dur :  78 ms      QT Int : 520 ms       P-R-T Axes :   *  68 -58 degrees     QTc Int : 607 ms    Accelerated Junctional rhythm  ST & T wave abnormality, consider inferior ischemia  Abnormal ECG  No previous ECGs available    Referred By: EDMD           Confirmed By:     ECG 12 Lead [[id number]] Collected: 10/15/22 " 2241     Updated: 10/15/22 2241     QT Interval 398 ms      QTC Interval 441 ms     Narrative:      Test Reason : nausea, vomiting  Blood Pressure :   */*   mmHG  Vent. Rate :  74 BPM     Atrial Rate :  71 BPM     P-R Int :   * ms          QRS Dur :  82 ms      QT Int : 398 ms       P-R-T Axes :   *  74  84 degrees     QTc Int : 441 ms    Atrial fibrillation with a competing junctional pacemaker  Abnormal ECG  When compared with ECG of 15-OCT-2022 21:10, (Unconfirmed)  Atrial fibrillation has replaced Junctional rhythm  Non-specific change in ST segment in Inferior leads  T wave inversion no longer evident in Inferior leads  QT has shortened    Referred By: EDMD           Confirmed By:         ECG 12 Lead   Preliminary Result   Test Reason : nausea, vomiting   Blood Pressure :   */*   mmHG   Vent. Rate :  74 BPM     Atrial Rate :  71 BPM      P-R Int :   * ms          QRS Dur :  82 ms       QT Int : 398 ms       P-R-T Axes :   *  74  84 degrees      QTc Int : 441 ms      Atrial fibrillation with a competing junctional pacemaker   Abnormal ECG   When compared with ECG of 15-OCT-2022 21:10, (Unconfirmed)   Atrial fibrillation has replaced Junctional rhythm   Non-specific change in ST segment in Inferior leads   T wave inversion no longer evident in Inferior leads   QT has shortened      Referred By: EDMD           Confirmed By:       ECG 12 Lead   Preliminary Result   Test Reason : chest pain   Blood Pressure :   */*   mmHG   Vent. Rate :  82 BPM     Atrial Rate :  77 BPM      P-R Int :   * ms          QRS Dur :  78 ms       QT Int : 520 ms       P-R-T Axes :   *  68 -58 degrees      QTc Int : 607 ms      Accelerated Junctional rhythm   ST & T wave abnormality, consider inferior ischemia   Abnormal ECG   No previous ECGs available      Referred By: EDMD           Confirmed By:                                         MDM  Number of Diagnoses or Management Options  Diaphoresis: new and requires workup  Elevated  troponin: new and requires workup  Flank pain: new and requires workup  Lactic acidosis: new and requires workup  Nausea and vomiting, unspecified vomiting type: new and requires workup     Amount and/or Complexity of Data Reviewed  Clinical lab tests: reviewed  Tests in the radiology section of CPT®: reviewed  Discuss the patient with other providers: yes    Risk of Complications, Morbidity, and/or Mortality  Presenting problems: moderate  Diagnostic procedures: moderate  Management options: moderate    Patient Progress  Patient progress: stable      Final diagnoses:   Nausea and vomiting, unspecified vomiting type   Diaphoresis   Lactic acidosis   Elevated troponin   Flank pain       ED Disposition  ED Disposition     ED Disposition   Decision to Admit    Condition   --    Comment   Level of Care: Telemetry [5]   Diagnosis: Nausea and vomiting, unspecified vomiting type [[id number]]   Admitting Physician: JAMES ROSE III [[id number]]   Attending Physician: JAMES ROSE III [[id number]]   Bed Request Comments: obs tele               No follow-up provider specified.       Medication List      No changes were made to your prescriptions during this visit.          Harjinder Morrow PA  10/15/22 2415

## 2022-10-16 NOTE — PLAN OF CARE
Goal Outcome Evaluation:  Plan of Care Reviewed With: patient         Arrived to floor in good spirits with no complaints. VSS, plan for an ECHO. CTM

## 2022-10-16 NOTE — PROGRESS NOTES
[facility] Medicine Services  PROGRESS NOTE    Patient Name: August Alex  : 1940  MRN: [id number]    Date of Admission: 10/15/2022  Primary Care Physician: Candelario Ayon MD    Subjective   Subjective     CC:  Nausea and vomiting    HPI:  Patient is an 82-year-old who presented to the emergency department with complaints of nausea vomiting and was found to have elevated troponin level.  He denies paulina chest pain.  His troponin levels have been relatively flat at 0.033.  Cardiology consult is pending.  He currently denies chest pain.  Nausea vomiting is improved with antiemetics.    During rounds EKG was reviewed which now shows a junctional bradycardia.    ROS:  Gen- No fevers, chills  CV- No chest pain, palpitations  Resp- No cough, dyspnea  GI-positive N/V, denies abd pain         Objective   Objective     Vital Signs:   Temp:  [97.6 °F (36.4 °C)-98.8 °F (37.1 °C)] 98.6 °F (37 °C)  Heart Rate:  [61-93] 61  Resp:  [18] 18  BP: (114-165)/(52-71) 136/56     Physical Exam:  Constitutional: No acute distress, awake, alert  HENT: NCAT, mucous membranes moist  Respiratory: Clear to auscultation bilaterally, respiratory effort normal   Cardiovascular: Bradycardia with a heart rate 48  Gastrointestinal: Positive bowel sounds, soft, nontender, nondistended  Musculoskeletal: No bilateral ankle edema  Psychiatric: Appropriate affect, cooperative  Neurologic: Oriented x 3, strength symmetric in all extremities, Cranial Nerves grossly intact to confrontation, speech clear  Skin: No rashes      Results Reviewed:  LAB RESULTS:      Lab 10/16/22  0206 10/15/22  2234 10/15/22  2113   WBC 9.14  --  14.55*   HEMOGLOBIN 8.5*  --  11.1*   HEMATOCRIT 26.3*  --  34.3*   PLATELETS 117*  --  152   NEUTROS ABS 7.62*  --  12.82*   IMMATURE GRANS (ABS) 0.05  --  0.06*   LYMPHS ABS 0.68*  --  0.60*   MONOS ABS 0.75  --  0.99*   EOS ABS 0.03  --  0.07   MCV 90.1  --  89.1   LACTATE 1.7 2.7* 3.8*          Lab 10/16/22  0206 10/15/22  2145   SODIUM 143 142   POTASSIUM 4.0 4.5   CHLORIDE 110* 106   CO2 23.0 23.0   ANION GAP 10.0 13.0   BUN 30* 30*   CREATININE 1.28* 1.24   EGFR 56.6* 58.8*   GLUCOSE 155* 132*   CALCIUM 9.3 10.4   MAGNESIUM 1.3*  --    HEMOGLOBIN A1C 6.00*  --    TSH 1.510  --          Lab 10/16/22  0206 10/15/22  2145   TOTAL PROTEIN 5.5* 6.9   ALBUMIN 3.40* 4.30   GLOBULIN 2.1 2.6   ALT (SGPT) 11 15   AST (SGOT) 17 26   BILIRUBIN 0.4 0.5   ALK PHOS 81 108   LIPASE  --  19         Lab 10/16/22  0517 10/16/22  0206 10/15/22  2234 10/15/22  2145   TROPONIN T 0.033* 0.034* 0.033* 0.029         Lab 10/16/22  0206   CHOLESTEROL 71   LDL CHOL 22   HDL CHOL 36*   TRIGLYCERIDES 47             Brief Urine Lab Results  (Last result in the past 365 days)      Color   Clarity   Blood   Leuk Est   Nitrite   Protein   CREAT   Urine HCG        10/15/22 2229 Dark Yellow   Clear   Negative   Small (1+)   Negative   30 mg/dL (1+)                 Microbiology Results Abnormal     Procedure Component Value - Date/Time    COVID PRE-OP / PRE-PROCEDURE SCREENING ORDER (NO ISOLATION) - Swab, Nasal Cavity [[id number]]  (Normal) Collected: 10/15/22 2053    Lab Status: Final result Specimen: Swab from Nasal Cavity Updated: 10/15/22 2137    Narrative:      The following orders were created for panel order COVID PRE-OP / PRE-PROCEDURE SCREENING ORDER (NO ISOLATION) - Swab, Nasal Cavity.  Procedure                               Abnormality         Status                     ---------                               -----------         ------                     COVID-19 and FLU A/B PCR...[[id number]]  Normal              Final result                 Please view results for these tests on the individual orders.    COVID-19 and FLU A/B PCR - Swab, Nasopharynx [[id number]]  (Normal) Collected: 10/15/22 2053    Lab Status: Final result Specimen: Swab from Nasopharynx Updated: 10/15/22 2137     COVID19 Not Detected     Influenza A PCR Not  Detected     Influenza B PCR Not Detected    Narrative:      Fact sheet for providers: https://www.fda.gov/media/797946/download    Fact sheet for patients: https://www.fda.gov/media/533124/download    Test performed by PCR.          CT Abdomen Pelvis Without Contrast    Result Date: 10/15/2022  EXAM: CT OF THE ABDOMEN AND PELVIS WITHOUT IV CONTRAST INDICATIONS: Generalized abdominal pain, nausea, vomiting, diarrhea TECHNIQUE: CT of the abdomen and pelvis was performed without the administration of intravenous or oral contrast. CT dose lowering techniques were used, to include: automated exposure control, adjustment for patient size, and / or use of iterative reconstruction. COMPARISON: No prior abdominal or pelvic CTs are in the system. FINDINGS: Bones: No destructive osseous lesions. Lung bases: Unremarkable ABDOMEN: Liver: Unremarkable in noncontrast appearance. Gallbladder and Bile Ducts: Unremarkable CT appearance of the gallbladder. There is no intrahepatic or extrahepatic biliary ductal dilatation. Spleen: Unremarkable in noncontrast appearance. Pancreas: The pancreatic parenchyma is unremarkable. There is no pancreatic ductal dilatation. Adrenals: Unremarkable without nodularity. Kidneys: There is no hydroureteronephrosis. Nonobstructing stones are present in each kidney. Vasculature: There are atherosclerotic calcifications throughout the abdomen and pelvis, without aneurysm. Nodes: There is no lymphadenopathy by size criteria. Bowel: There is no bowel obstruction. The right hemicolon is distended. The left hemicolon is collapsed. The appendix is not identified, however there are no inflammatory changes at the cecal base. Mesentery/Peritoneum: Unremarkable Soft Tissues: Unremarkable PELVIS: Pelvic Organs: The prostate gland is enlarged and indents the bladder base. The urinary bladder is unremarkable.     Impression: 1. No evidence of acute disease in the abdomen or pelvis, accounting for the limitations of  the noncontrast technique. 2. Nonobstructing stones in each kidney. 3. Enlarged prostate. 4. Additional chronic findings as above. Electronically signed by:  Donis Carranza M.D.  10/15/2022 7:51 PM Mountain Time          I have reviewed the medications:  Scheduled Meds:insulin lispro, 0-9 Units, Subcutaneous, TID AC  sodium chloride, 10 mL, Intravenous, Q12H      Continuous Infusions:lactated ringers, 100 mL/hr, Last Rate: 100 mL/hr (10/16/22 0057)      PRN Meds:.•  acetaminophen  •  dextrose  •  dextrose  •  glucagon (human recombinant)  •  HYDROcodone-acetaminophen  •  influenza vaccine  •  melatonin  •  ondansetron  •  [COMPLETED] Insert peripheral IV **AND** sodium chloride  •  sodium chloride    Assessment & Plan   Assessment & Plan     Active Hospital Problems    Diagnosis  POA   • **Nausea and vomiting, unspecified vomiting type [R11.2]  Yes   • Elevated troponin [R77.8]  Yes   • Anemia [D64.9]  Yes   • Atrial fibrillation (HCC) [I48.91]  Yes   • Essential hypertension [I10]  Yes   • Hyperlipidemia [E78.5]  Yes   • Type 2 diabetes mellitus (HCC) [E11.9]  Yes      Resolved Hospital Problems   No resolved problems to display.        Brief Hospital Course to date:  August Alex is a 82 y.o. male with history of atrial fibrillation on Eliquis, hypertension, hyperlipidemia, diabetes mellitus type 2 and BPH who presented with nausea and vomiting and was noted to have an elevated troponin.    Junctional bradycardia  Elevated troponin  Atrial fibrillation  -Repeat EKG shows junctional bradycardia  -Cardiology consult pending  -Holding Eliquis pending cardiology consult  -No rate controlling meds on home med list  -Will likely need pacemaker    Nausea and vomiting  Lactic acidosis  -Improved with antiemetics  -Unclear etiology  -Work-up thus far included CT abdomen pelvis negative  -Repeat lactate normal after IV fluids, initially 2.7  -Lipase was normal    Mild acute kidney  injury  Dehydration  Ketonuria  -Creatinine 1.28  -Likely secondary to dehydration    Anemia  -Uncertain etiology  -Hemoglobin 8.5  -Continue to monitor, transfuse for hemoglobin less than 7    Hypomagnesemia  -Replace per protocol    Diabetes mellitus type 2  -Hemoglobin A1c 6.00  -Holding oral agent  -Sliding scale insulin as needed    Hypertension  -On lisinopril 30 mg at home  -Plan to resume pending cardiology consult    Hyperlipidemia  -No statin listed on home meds    Expected Discharge Location and Transportation: Home, private car  Expected Discharge Date: 10/20/2022    DVT prophylaxis:  Medical DVT prophylaxis orders are present.          CODE STATUS:   Code Status and Medical Interventions:   Ordered at: 10/15/22 5434     Level Of Support Discussed With:    Patient     Code Status (Patient has no pulse and is not breathing):    CPR (Attempt to Resuscitate)     Medical Interventions (Patient has pulse or is breathing):    Full Support       Betsy Pride MD  10/16/22

## 2022-10-16 NOTE — H&P
[facility] Medicine Services  HISTORY AND PHYSICAL    Patient Name: August Alex  : 1940  MRN: [id number]  Primary Care Physician: Candelario Ayon MD  Date of admission: 10/15/2022    Subjective   Subjective     Chief Complaint:  Abdominal pain, nausea    HPI:  August Alex is a 82 y.o. male with a history of A Fib (on Eliquis), HTN, HLD, T2DM and BPH who presents to [facility] ED for complaint of nausea and vomiting. He states this began earlier this evening while watching TV. Multiple episodes of vomiting. Denies fever, chills, chest pain, SOB, or syncope.         Review of Systems   Constitutional: Negative for chills, fatigue, fever and unexpected weight change.   HENT: Negative for nosebleeds, postnasal drip, sore throat and trouble swallowing.    Eyes: Negative for photophobia and visual disturbance.   Respiratory: Negative for cough, shortness of breath and wheezing.    Cardiovascular: Negative for chest pain and palpitations.   Gastrointestinal: Positive for abdominal pain, nausea and vomiting. Negative for diarrhea.   Genitourinary: Negative for dysuria and hematuria.   Musculoskeletal: Negative for arthralgias and myalgias.   Skin: Negative.    Neurological: Negative for tremors, syncope, speech difficulty and weakness.   Psychiatric/Behavioral: Negative for confusion. The patient is not nervous/anxious.       All other systems reviewed and are negative.     Personal History     Past Medical History:   Diagnosis Date   • Diabetes (HCC)    • Hypertension              No past surgical history on file.    Family History:  family history includes Heart disease in his father; Hyperlipidemia in his father. Otherwise pertinent FHx was reviewed and unremarkable.     Social History:    Social History     Social History Narrative   • Not on file       Medications:       No Known Allergies    Objective   Objective     Vital Signs:   Temp:  [97.6 °F (36.4 °C)] 97.6 °F  (36.4 °C)  Heart Rate:  [93] 93  Resp:  [18] 18  BP: (129)/(69) 129/69    Physical Exam  Constitutional:       General: He is not in acute distress.     Appearance: Normal appearance.   HENT:      Head: Atraumatic.      Right Ear: External ear normal.      Left Ear: External ear normal.      Nose: Nose normal.   Eyes:      Extraocular Movements: Extraocular movements intact.      Conjunctiva/sclera: Conjunctivae normal.      Pupils: Pupils are equal, round, and reactive to light.   Cardiovascular:      Rate and Rhythm: Normal rate. Rhythm irregular.      Pulses: Normal pulses.      Heart sounds: Normal heart sounds. No murmur heard.  Pulmonary:      Effort: Pulmonary effort is normal. No respiratory distress.      Breath sounds: Normal breath sounds. No wheezing, rhonchi or rales.   Abdominal:      General: Bowel sounds are normal. There is no distension.      Tenderness: There is no abdominal tenderness. There is no guarding or rebound.   Musculoskeletal:         General: Normal range of motion.      Cervical back: No rigidity.      Right lower leg: No edema.      Left lower leg: No edema.   Skin:     General: Skin is warm and dry.      Coloration: Skin is not jaundiced.      Findings: No lesion or rash.   Neurological:      General: No focal deficit present.      Mental Status: He is alert and oriented to person, place, and time.   Psychiatric:         Attention and Perception: Attention normal.         Mood and Affect: Mood normal.         Behavior: Behavior normal.         Thought Content: Thought content normal.          Results Reviewed:  I have personally reviewed most recent indicated data and agree with findings including:  [x]  Laboratory  [x]  Radiology  [x]  EKG/Telemetry  []  Pathology  []  Cardiac/Vascular Studies  []  Old records  []  Other:      LAB RESULTS:      Lab 10/15/22  2234 10/15/22  2113   WBC  --  14.55*   HEMOGLOBIN  --  11.1*   HEMATOCRIT  --  34.3*   PLATELETS  --  152   NEUTROS ABS  --   12.82*   IMMATURE GRANS (ABS)  --  0.06*   LYMPHS ABS  --  0.60*   MONOS ABS  --  0.99*   EOS ABS  --  0.07   MCV  --  89.1   LACTATE 2.7* 3.8*         Lab 10/15/22  2145   SODIUM 142   POTASSIUM 4.5   CHLORIDE 106   CO2 23.0   ANION GAP 13.0   BUN 30*   CREATININE 1.24   EGFR 58.8*   GLUCOSE 132*   CALCIUM 10.4         Lab 10/15/22  2145   TOTAL PROTEIN 6.9   ALBUMIN 4.30   GLOBULIN 2.6   ALT (SGPT) 15   AST (SGOT) 26   BILIRUBIN 0.5   ALK PHOS 108   LIPASE 19         Lab 10/15/22  2234 10/15/22  2145   TROPONIN T 0.033* 0.029                 Brief Urine Lab Results  (Last result in the past 365 days)      Color   Clarity   Blood   Leuk Est   Nitrite   Protein   CREAT   Urine HCG        10/15/22 2229 Dark Yellow   Clear   Negative   Small (1+)   Negative   30 mg/dL (1+)               Microbiology Results (last 10 days)     Procedure Component Value - Date/Time    COVID PRE-OP / PRE-PROCEDURE SCREENING ORDER (NO ISOLATION) - Swab, Nasal Cavity [[id number]]  (Normal) Collected: 10/15/22 2053    Lab Status: Final result Specimen: Swab from Nasal Cavity Updated: 10/15/22 2137    Narrative:      The following orders were created for panel order COVID PRE-OP / PRE-PROCEDURE SCREENING ORDER (NO ISOLATION) - Swab, Nasal Cavity.  Procedure                               Abnormality         Status                     ---------                               -----------         ------                     COVID-19 and FLU A/B PCR...[[id number]]  Normal              Final result                 Please view results for these tests on the individual orders.    COVID-19 and FLU A/B PCR - Swab, Nasopharynx [[id number]]  (Normal) Collected: 10/15/22 2053    Lab Status: Final result Specimen: Swab from Nasopharynx Updated: 10/15/22 2137     COVID19 Not Detected     Influenza A PCR Not Detected     Influenza B PCR Not Detected    Narrative:      Fact sheet for providers: https://www.fda.gov/media/006516/download    Fact sheet for patients:  https://www.fda.gov/media/897414/download    Test performed by [facility].          CT Abdomen Pelvis Without Contrast    Result Date: 10/15/2022  EXAM: CT OF THE ABDOMEN AND PELVIS WITHOUT IV CONTRAST INDICATIONS: Generalized abdominal pain, nausea, vomiting, diarrhea TECHNIQUE: CT of the abdomen and pelvis was performed without the administration of intravenous or oral contrast. CT dose lowering techniques were used, to include: automated exposure control, adjustment for patient size, and / or use of iterative reconstruction. COMPARISON: No prior abdominal or pelvic CTs are in the system. FINDINGS: Bones: No destructive osseous lesions. Lung bases: Unremarkable ABDOMEN: Liver: Unremarkable in noncontrast appearance. Gallbladder and Bile Ducts: Unremarkable CT appearance of the gallbladder. There is no intrahepatic or extrahepatic biliary ductal dilatation. Spleen: Unremarkable in noncontrast appearance. Pancreas: The pancreatic parenchyma is unremarkable. There is no pancreatic ductal dilatation. Adrenals: Unremarkable without nodularity. Kidneys: There is no hydroureteronephrosis. Nonobstructing stones are present in each kidney. Vasculature: There are atherosclerotic calcifications throughout the abdomen and pelvis, without aneurysm. Nodes: There is no lymphadenopathy by size criteria. Bowel: There is no bowel obstruction. The right hemicolon is distended. The left hemicolon is collapsed. The appendix is not identified, however there are no inflammatory changes at the cecal base. Mesentery/Peritoneum: Unremarkable Soft Tissues: Unremarkable PELVIS: Pelvic Organs: The prostate gland is enlarged and indents the bladder base. The urinary bladder is unremarkable.     Impression: 1. No evidence of acute disease in the abdomen or pelvis, accounting for the limitations of the noncontrast technique. 2. Nonobstructing stones in each kidney. 3. Enlarged prostate. 4. Additional chronic findings as above. Electronically signed by:   Donis Carranza M.D.  10/15/2022 7:51 PM Mountain Time          Assessment & Plan   Assessment & Plan       Nausea and vomiting, unspecified vomiting type    Essential hypertension    Hyperlipidemia    Type 2 diabetes mellitus (HCC)      August Alex is a 82 y.o. male with a history of A Fib (on Eliquis), HTN, HLD, T2DM and BPH who presents to [facility] ED for complaint of nausea and vomiting.    Nausea/Vomiting  Elevated Troponin  -CT bd/pelvis negative for acute findings  -Zofran for nausea  -Initial Troponin was on the high end of normal. Repeat shows elevation to 0.033. Continue to trend.  -EKG shows A-Fib. Rate controlled with some non-specific ST changes in the inferior leads.  -Cardiology consult in the am  -NPO after midnight.  -Echo in the am  -Monitor on Tele    Atrial Fib  -EKG shows a fib, rate controlled.  -Continue Eliquis    Leukocytosis  -WBC 14.55. Etiology unclear.  -UA unremarkable.  -Lactate elevated at 3.8, has since come down to 2.7 after 2L Nc bolus  -Likely 2ry to recent vomiting.     HTN  HLD  -Takes Lisinopril  -Continue statin  -Lipid panel in the am    T2DM  -Blood glucose 132  -Check A1C  -Fingerstick achs. SSI        DVT prophylaxis:  Eliquis    CODE STATUS:  Full code  Level Of Support Discussed With: Patient  Code Status (Patient has no pulse and is not breathing): CPR (Attempt to Resuscitate)  Medical Interventions (Patient has pulse or is breathing): Full Support      This note has been completed as part of a split-shared workflow.     Signature: Electronically signed by Marcelino Hebert PA-C, 10/15/22, 11:55 PM EDT      Attending   Admission Attestation       I have performed an independent face-to-face diagnostic evaluation including performing an independent physical examination as documented here.  The documented plan of care above was reviewed and developed with the advanced practice clinician (APC).      Brief Summary Statement:   August Alex is a 82 y.o. male  "who states that he was watching a football game this afternoon (Saturday) and had rather sudden onset of nausea and then a bout of emesis.  He states that he \"barely made it to the bathroom\" after onset of his nausea.  He states he then had a single bout of diarrhea.  He confirms no paulina blood in the emesis or stool.  No dark stools as well.  He states he also had an episode of diaphoresis with with the nausea and emesis, along with some chest tightness.  He states all of his symptoms have now resolved, other than some residual epigastric pain which he states he feels is \"forever throughout.\"  Work-up in the ED included a check of his troponin with 1 follow-up study; he did have a slight increase and will therefore be admitted for observation.  He also had increased lactate on arrival which has improved with IV fluids.  He denies fever/chills, focal neurologic deficit beyond baseline, or syncope.    Remainder of detailed HPI is as noted by APC and has been reviewed and/or edited by me for completeness.    Attending Physical Exam:  Temp:  [97.6 °F (36.4 °C)] 97.6 °F (36.4 °C)  Heart Rate:  [93] 93  Resp:  [18] 18  BP: (129)/(69) 129/69    Constitutional: Awake, alert, NAD, very pleasant  Eyes: PERRLA, sclerae anicteric, no conjunctival injection  HENT: NCAT, mucous membranes moist  Neck: Supple, no thyromegaly, no lymphadenopathy, trachea midline  Respiratory: Clear to auscultation bilaterally, nonlabored respirations   Cardiovascular: Irregularly irregular but rate controlled, no murmurs, rubs, or gallops, palpable pedal pulses bilaterally  Gastrointestinal: Positive bowel sounds, soft, nontender, nondistended  Musculoskeletal: No bilateral ankle edema, no clubbing or cyanosis to extremities  Psychiatric: Appropriate affect, cooperative  Neurologic: Oriented x 3, strength symmetric in all extremities, Cranial Nerves grossly intact to confrontation, speech clear  Skin: No rashes, normal turgor.    Brief " Assessment/Plan :  See detailed assessment and plan developed with APC which I have reviewed and/or edited for completeness.        Admission Status: I believe that this patient meets observation criteria.  I reserve the right to advance his admission status to full inpatient should his condition change and/or new information arise.      Ben Johnston,III, DO  10/16/22

## 2022-10-16 NOTE — PROGRESS NOTES
"                    Clinical Nutrition     Patient Name: August Alex  YOB: 1940  MRN: [id number]  Date of Encounter: 10/16/22 11:36 EDT  Admission date: 10/15/2022    Reason for Visit   Identified at risk by screening criteria: unintentional loss of 10# or more within 2 months    EMR Reviewed: Yes     Diet Nutrition Related History:      Pt admitted with n/v. Diet tech visited pt. Observed prominent clavicles and temporal region on pt. Pt reports sudden onset of n/v yesterday. Pt was unable to tolerate food yesterday, but had breakfast today. Pt states he ate a few bites. Breakfast documented at 75%.  Pt reports normally eating oatmeal, grape fruit, oranges, eggs, and Shane Noodles. Pt does reports sometimes eating out with sister at [employer] or [employer]. NKFA were reported. Pt states his UBW is around 150-155#. Hospitalist came in pt's room during visit. Diet tech will order Boost Glucose control once daily. Per EMR hx, pt has been losing wt prior to symptoms of n/v. Pt has had a wt loss of 13# (8.3%) within ~ 2 months (signficant to malnutrition criteria and timeframe).    Anthropometrics   Height: Height: 175.3 cm (69\")  Admission Weight:   Flowsheet Rows    Flowsheet Row First Filed Value   Admission Height 175.3 cm (69\") Documented at 10/15/2022 2023   Admission Weight 63.5 kg (140 lb) Documented at 10/15/2022 2023        Last Filed Weight:Weight: 64.4 kg (142 lb) (10/16/22 0030)  Weight Method: Standing scale  BMI: BMI (Calculated): 21  BMI classification: Normal: 18.5-24.9kg/m2   IBW: Ideal Body Weight (IBW) (kg): 73.69  EMR [facility]: 147# (22), 155# (22), 155# (22)  UBW: 150-155# per pt  Weight change: 13# wt loss per EMR (22)/UBW     % change:  8.3%  Time frame:  ~2 months     Current Nutrition Prescription     Diet Regular; Consistent Carbohydrate    ONS: Boost Glucose Control once daily     Average Intake from [place]% x 1 meal    Intake History: "     Intake Category  N/A    Actions   Appropriateness for MSA:  Malnutrition exam warranted based on observation, RDN to assess/evaluate per protocol    Interventions:   Follow treatment progress, Care plan reviewed, Advise alternate selection, Interview for preferences, Menu provided, Encourage intake, Supplement provided, RND to f/up with CHRISTA Guzman,   Time Spent: 20 min

## 2022-10-17 LAB
GLUCOSE BLDC GLUCOMTR-MCNC: 135 MG/DL (ref 70–130)
GLUCOSE BLDC GLUCOMTR-MCNC: 172 MG/DL (ref 70–130)
GLUCOSE BLDC GLUCOMTR-MCNC: 176 MG/DL (ref 70–130)
QT INTERVAL: 450 MS
QTC INTERVAL: 426 MS

## 2022-10-17 PROCEDURE — G0378 HOSPITAL OBSERVATION PER HR: HCPCS

## 2022-10-17 PROCEDURE — 97162 PT EVAL MOD COMPLEX 30 MIN: CPT

## 2022-10-17 PROCEDURE — 25010000002 HYDRALAZINE PER 20 MG: Performed by: NURSE PRACTITIONER

## 2022-10-17 PROCEDURE — 97110 THERAPEUTIC EXERCISES: CPT

## 2022-10-17 PROCEDURE — 96375 TX/PRO/DX INJ NEW DRUG ADDON: CPT

## 2022-10-17 PROCEDURE — 99214 OFFICE O/P EST MOD 30 MIN: CPT | Performed by: INTERNAL MEDICINE

## 2022-10-17 PROCEDURE — 63710000001 INSULIN LISPRO (HUMAN) PER 5 UNITS: Performed by: PHYSICIAN ASSISTANT

## 2022-10-17 PROCEDURE — 82962 GLUCOSE BLOOD TEST: CPT

## 2022-10-17 PROCEDURE — 99225 PR SBSQ OBSERVATION CARE/DAY 25 MINUTES: CPT | Performed by: INTERNAL MEDICINE

## 2022-10-17 RX ORDER — HYDRALAZINE HYDROCHLORIDE 20 MG/ML
10 INJECTION INTRAMUSCULAR; INTRAVENOUS ONCE
Status: COMPLETED | OUTPATIENT
Start: 2022-10-17 | End: 2022-10-17

## 2022-10-17 RX ADMIN — HYDRALAZINE HYDROCHLORIDE 10 MG: 20 INJECTION INTRAMUSCULAR; INTRAVENOUS at 22:24

## 2022-10-17 RX ADMIN — ACETAMINOPHEN 650 MG: 325 TABLET, FILM COATED ORAL at 04:05

## 2022-10-17 RX ADMIN — Medication 10 ML: at 08:19

## 2022-10-17 RX ADMIN — INSULIN LISPRO 2 UNITS: 100 INJECTION, SOLUTION INTRAVENOUS; SUBCUTANEOUS at 11:20

## 2022-10-17 RX ADMIN — Medication 10 ML: at 21:53

## 2022-10-17 RX ADMIN — INSULIN LISPRO 2 UNITS: 100 INJECTION, SOLUTION INTRAVENOUS; SUBCUTANEOUS at 08:18

## 2022-10-17 NOTE — CASE MANAGEMENT/SOCIAL WORK
Discharge Planning Assessment  [facility]     Patient Name: August Alex  MRN: [id number]  Today's Date: 10/17/2022    Admit Date: 10/15/2022    Plan: Home   Discharge Needs Assessment     Row Name 10/17/22 1706       Living Environment    People in Home sibling(s)    Name(s) of People in Home Jaimee Alex    Current Living Arrangements home    Primary Care Provided by self    Provides Primary Care For no one    Family Caregiver if Needed sibling(s)    Able to Return to Prior Arrangements yes       Resource/Environmental Concerns    Resource/Environmental Concerns none    Transportation Concerns no car       Transition Planning    Patient/Family Anticipates Transition to home with family    Patient/Family Anticipated Services at Transition none    Transportation Anticipated family or friend will provide       Discharge Needs Assessment    Readmission Within the Last 30 Days no previous admission in last 30 days    Equipment Currently Used at Home none    Concerns to be Addressed no discharge needs identified;denies needs/concerns at this time    Anticipated Changes Related to Illness none    Equipment Needed After Discharge none               Discharge Plan     Row Name 10/17/22 1707       Plan    Plan Home    Patient/Family in Agreement with Plan yes    Plan Comments Met & spoke with Mr Alex at the bedside. He lives with his sister in [facility]. At baseline, is ind with ADL's/mobility. Denies DME/HH/Rehab/O2. No advanced directives. Still drives. Cards following peripherally. PT rec outpatient therapy. No needs voiced. Plan is home and his neighbor will transport.    Final Discharge Disposition Code 30 - still a patient              Continued Care and Services - Admitted Since 10/15/2022    Coordination has not been started for this encounter.       Expected Discharge Date and Time     Expected Discharge Date Expected Discharge Time    Oct 24, 2022          Demographic Summary     Row Name 10/17/22 1706        General Information    Admission Type observation    General Information Comments Verified PCP is Dr Ayon. Primary insurance is [employer]. Has drug coverage.       Contact Information    Permission Granted to Share Info With     Contact Information Obtained for                Functional Status     Row Name 10/17/22 7171       Functional Status    Usual Activity Tolerance good    Current Activity Tolerance good       Functional Status, IADL    Medications independent    Meal Preparation independent    Housekeeping independent    Laundry independent    Shopping independent               Psychosocial    No documentation.                Abuse/Neglect    No documentation.                Legal    No documentation.                Substance Abuse    No documentation.                Patient Forms    No documentation.                   Bobbi Davidson RN

## 2022-10-17 NOTE — PROGRESS NOTES
"[facility] Progress Note     LOS: 0 days   Patient Care Team:  Candelario Ayon MD as PCP - General (Internal Medicine)  PCP:  Candelario Ayon MD    Chief Complaint: Follow-up bradycardia, atrial flutter, mild troponin elevation    Subjective: Patient has some abdominal soreness where he had been vomiting.  He states he did eat hamburgers at a tailgate on Friday night unsure if that would have made him sick.  He does not have any chest pain now or exertional chest pain at home.  Heart rate during my visit varied between 50 and 70 bpm      Review of Systems:   All systems have been reviewed and are negative with the exception of those mentioned above.      Objective:    Vital Sign Min/Max for last 24 hours  Temp  Min: 97.6 °F (36.4 °C)  Max: 98.6 °F (37 °C)   BP  Min: 142/61  Max: 184/87   Pulse  Min: 47  Max: 74   Resp  Min: 18  Max: 20   SpO2  Min: 93 %  Max: 100 %   No data recorded   No data recorded     Flowsheet Rows    Flowsheet Row First Filed Value   Admission Height 175.3 cm (69\") Documented at 10/15/2022 2023   Admission Weight 63.5 kg (140 lb) Documented at 10/15/2022 2023          Telemetry: Atrial fibrillation with slow response      Intake/Output Summary (Last 24 hours) at 10/17/2022 1531  Last data filed at 10/17/2022 1300  Gross per 24 hour   Intake 840 ml   Output 950 ml   Net -110 ml     Intake & Output (last 3 days)       10/14 0701  10/15 0700 10/15 0701  10/16 0700 10/16 0701  10/17 0700 10/17 0701  10/18 0700    P.O.  200 960 480    I.V. (mL/kg)   1050 (16.3)     Total Intake(mL/kg)  200 (3.1) 2010 (31.2) 480 (7.5)    Urine (mL/kg/hr)   750 (0.5) 600 (1.1)    Total Output   750 600    Net  +200 +1260 -120            Urine Unmeasured Occurrence   1 x            Physical Exam:  Constitutional:       Appearance: Not in distress.   Cardiovascular:      Bradycardia present. Irregular rhythm.      Murmurs: There is no murmur.   Edema:     " Pretibial: trace edema of the left pretibial area.  Abdominal:      Palpations: Abdomen is soft.      Tenderness: There is abdominal tenderness.   Neurological:      General: No focal deficit present.          LABS/DIAGNOSTIC DATA:  Results from last 7 days   Lab Units 10/16/22  0206 10/15/22  2113   WBC 10*3/mm3 9.14 14.55*   HEMOGLOBIN g/dL 8.5* 11.1*   HEMATOCRIT % 26.3* 34.3*   PLATELETS 10*3/mm3 117* 152     Lab Results   Lab Value Date/Time    TROPONINT 0.033 (C) 10/16/2022 0517    TROPONINT 0.034 (C) 10/16/2022 0206    TROPONINT 0.033 (C) 10/15/2022 2234    TROPONINT 0.029 10/15/2022 2145         Results from last 7 days   Lab Units 10/16/22  0206 10/15/22  2145   SODIUM mmol/L 143 142   POTASSIUM mmol/L 4.0 4.5   CHLORIDE mmol/L 110* 106   CO2 mmol/L 23.0 23.0   BUN mg/dL 30* 30*   CREATININE mg/dL 1.28* 1.24   CALCIUM mg/dL 9.3 10.4   BILIRUBIN mg/dL 0.4 0.5   ALK PHOS U/L 81 108   ALT (SGPT) U/L 11 15   AST (SGOT) U/L 17 26   GLUCOSE mg/dL 155* 132*     Results from last 7 days   Lab Units 10/16/22  0206   HEMOGLOBIN A1C % 6.00*     Results from last 7 days   Lab Units 10/16/22  0206   CHOLESTEROL mg/dL 71   TRIGLYCERIDES mg/dL 47   HDL CHOL mg/dL 36*   LDL CHOL mg/dL 22     Results from last 7 days   Lab Units 10/16/22  0206   TSH uIU/mL 1.510       ECHO:  Left ventricular systolic function is normal. stimated left ventricular EF = 60%.  •  Aortic valve is sclerotic no stenosis or regurgitation  •  Estimated right ventricular systolic pressure from tricuspid regurgitation is normal (<35 mmHg).      Medication Review:   insulin lispro, 0-9 Units, Subcutaneous, TID AC  sodium chloride, 10 mL, Intravenous, Q12H               Junctional bradycardia    Nausea and vomiting, unspecified vomiting type    Essential hypertension    Hyperlipidemia    Type 2 diabetes mellitus (HCC)    Elevated troponin    Anemia    Atrial fibrillation (HCC)      Assessment/Plan:      1.  Atrial fibrillation with slow ventricular  response  -Patient is hypertensive and asymptomatic.  Does not require pacing at this time  -Follow-up hemoglobin in a.m. and resume Eliquis if stable.  If continues to trend down agree that GI consult would be reasonable    2.  Mildly elevated troponin  -In the setting of leukocytosis nausea vomiting, anemia  -No ischemic EKG change,, no angina  -We will defer on further ischemic evaluation at this time    3.  Hypertension  -Monitor.  Follow-up renal function in a.m.  Resume lisinopril if stabilizing    Will follow peripherally for now.  Please call with any questions or concerns    West Lawler MD [facility]  10/17/22

## 2022-10-17 NOTE — PLAN OF CARE
Goal Outcome Evaluation:  Plan of Care Reviewed With: patient        Progress: no change  Outcome Evaluation: PT eval complete. Patient demonstrates independence with mobility however demonstrates decreased balance. He has had 3 falls at home, and demonstrated >5 LOB during PT assessment, 3 needing assist from PT to regain balance while standing on one leg to demonstrate movement to PT. Patient does ambulate with slowed kandis and small LOB/ pathway deviations with ambulation, but able to self correct LOB. Patient returned to room and sat at EOB to complete exercsies. Patient would benefit from skilled PT services to improve dynamic balance and decrease fall risk. Patient would benefit from outpatient PT to continue balance training

## 2022-10-17 NOTE — PROGRESS NOTES
[facility] Medicine Services  PROGRESS NOTE    Patient Name: August Alex  : 1940  MRN: [id number]    Date of Admission: 10/15/2022  Primary Care Physician: Candelario Ayon MD    Subjective   Subjective     CC:  Nausea and vomiting    HPI:  Patient reports that his nausea is improved.  Tolerating diet.  Wants some milk for his oatmeal.  Unsure if he still feels dizzy because he has not gotten up yet today.    ROS:  General: denies fevers or chills  CV: denies chest pain  Resp: denies shortness of breath  Abd: denies abd pain, nausea      Objective   Objective     Vital Signs:   Temp:  [97.6 °F (36.4 °C)-98.6 °F (37 °C)] 98.3 °F (36.8 °C)  Heart Rate:  [43-74] 74  Resp:  [18] 18  BP: (136-170)/(56-79) 170/68     Physical Exam:  Constitutional: No acute distress, awake, alert, sitting in bed drinking coffee  Respiratory: Clear to auscultation bilaterally, respiratory effort normal on room air  Cardiovascular: RRR, no murmurs, rubs, or gallops  Gastrointestinal: Positive bowel sounds, soft, nontender  Musculoskeletal: No bilateral ankle edema  Psychiatric: Appropriate affect, cooperative  Neurologic: Oriented x 3, no focal deficits      Results Reviewed:  LAB RESULTS:      Lab 10/16/22  0206 10/15/22  2234 10/15/22  2113   WBC 9.14  --  14.55*   HEMOGLOBIN 8.5*  --  11.1*   HEMATOCRIT 26.3*  --  34.3*   PLATELETS 117*  --  152   NEUTROS ABS 7.62*  --  12.82*   IMMATURE GRANS (ABS) 0.05  --  0.06*   LYMPHS ABS 0.68*  --  0.60*   MONOS ABS 0.75  --  0.99*   EOS ABS 0.03  --  0.07   MCV 90.1  --  89.1   LACTATE 1.7 2.7* 3.8*         Lab 10/16/22  0206 10/15/22  2145   SODIUM 143 142   POTASSIUM 4.0 4.5   CHLORIDE 110* 106   CO2 23.0 23.0   ANION GAP 10.0 13.0   BUN 30* 30*   CREATININE 1.28* 1.24   EGFR 56.6* 58.8*   GLUCOSE 155* 132*   CALCIUM 9.3 10.4   MAGNESIUM 1.3*  --    HEMOGLOBIN A1C 6.00*  --    TSH 1.510  --          Lab 10/16/22  0206 10/15/22  1471   TOTAL PROTEIN 5.5*  6.9   ALBUMIN 3.40* 4.30   GLOBULIN 2.1 2.6   ALT (SGPT) 11 15   AST (SGOT) 17 26   BILIRUBIN 0.4 0.5   ALK PHOS 81 108   LIPASE  --  19         Lab 10/16/22  0517 10/16/22  0206 10/15/22  2234 10/15/22  2145   TROPONIN T 0.033* 0.034* 0.033* 0.029         Lab 10/16/22  0206   CHOLESTEROL 71   LDL CHOL 22   HDL CHOL 36*   TRIGLYCERIDES 47             Brief Urine Lab Results  (Last result in the past 365 days)      Color   Clarity   Blood   Leuk Est   Nitrite   Protein   CREAT   Urine HCG        10/15/22 2229 Dark Yellow   Clear   Negative   Small (1+)   Negative   30 mg/dL (1+)                 Microbiology Results Abnormal     Procedure Component Value - Date/Time    COVID PRE-OP / PRE-PROCEDURE SCREENING ORDER (NO ISOLATION) - Swab, Nasal Cavity [[id number]]  (Normal) Collected: 10/15/22 2053    Lab Status: Final result Specimen: Swab from Nasal Cavity Updated: 10/15/22 2137    Narrative:      The following orders were created for panel order COVID PRE-OP / PRE-PROCEDURE SCREENING ORDER (NO ISOLATION) - Swab, Nasal Cavity.  Procedure                               Abnormality         Status                     ---------                               -----------         ------                     COVID-19 and FLU A/B PCR...[[id number]]  Normal              Final result                 Please view results for these tests on the individual orders.    COVID-19 and FLU A/B PCR - Swab, Nasopharynx [[id number]]  (Normal) Collected: 10/15/22 2053    Lab Status: Final result Specimen: Swab from Nasopharynx Updated: 10/15/22 2137     COVID19 Not Detected     Influenza A PCR Not Detected     Influenza B PCR Not Detected    Narrative:      Fact sheet for providers: https://www.fda.gov/media/975115/download    Fact sheet for patients: https://www.fda.gov/media/483374/download    Test performed by PCR.          Adult Transthoracic Echo Complete W/ Cont if Necessary Per Protocol    Result Date: 10/16/2022  •  Left ventricular  systolic function is normal. stimated left ventricular EF = 60%. •  Aortic valve is sclerotic no stenosis or regurgitation •  Estimated right ventricular systolic pressure from tricuspid regurgitation is normal (<35 mmHg).     CT Abdomen Pelvis Without Contrast    Result Date: 10/15/2022  EXAM: CT OF THE ABDOMEN AND PELVIS WITHOUT IV CONTRAST INDICATIONS: Generalized abdominal pain, nausea, vomiting, diarrhea TECHNIQUE: CT of the abdomen and pelvis was performed without the administration of intravenous or oral contrast. CT dose lowering techniques were used, to include: automated exposure control, adjustment for patient size, and / or use of iterative reconstruction. COMPARISON: No prior abdominal or pelvic CTs are in the system. FINDINGS: Bones: No destructive osseous lesions. Lung bases: Unremarkable ABDOMEN: Liver: Unremarkable in noncontrast appearance. Gallbladder and Bile Ducts: Unremarkable CT appearance of the gallbladder. There is no intrahepatic or extrahepatic biliary ductal dilatation. Spleen: Unremarkable in noncontrast appearance. Pancreas: The pancreatic parenchyma is unremarkable. There is no pancreatic ductal dilatation. Adrenals: Unremarkable without nodularity. Kidneys: There is no hydroureteronephrosis. Nonobstructing stones are present in each kidney. Vasculature: There are atherosclerotic calcifications throughout the abdomen and pelvis, without aneurysm. Nodes: There is no lymphadenopathy by size criteria. Bowel: There is no bowel obstruction. The right hemicolon is distended. The left hemicolon is collapsed. The appendix is not identified, however there are no inflammatory changes at the cecal base. Mesentery/Peritoneum: Unremarkable Soft Tissues: Unremarkable PELVIS: Pelvic Organs: The prostate gland is enlarged and indents the bladder base. The urinary bladder is unremarkable.     Impression: 1. No evidence of acute disease in the abdomen or pelvis, accounting for the limitations of the  noncontrast technique. 2. Nonobstructing stones in each kidney. 3. Enlarged prostate. 4. Additional chronic findings as above. Electronically signed by:  Donis Carranza M.D.  10/15/2022 7:51 PM Mountain Time      Results for orders placed during the hospital encounter of 10/15/22    Adult Transthoracic Echo Complete W/ Cont if Necessary Per Protocol    Interpretation Summary  •  Left ventricular systolic function is normal. stimated left ventricular EF = 60%.  •  Aortic valve is sclerotic no stenosis or regurgitation  •  Estimated right ventricular systolic pressure from tricuspid regurgitation is normal (<35 mmHg).      I have reviewed the medications:  Scheduled Meds:insulin lispro, 0-9 Units, Subcutaneous, TID AC  sodium chloride, 10 mL, Intravenous, Q12H      Continuous Infusions:   PRN Meds:.•  acetaminophen  •  dextrose  •  dextrose  •  glucagon (human recombinant)  •  HYDROcodone-acetaminophen  •  influenza vaccine  •  magnesium sulfate **OR** magnesium sulfate **OR** magnesium sulfate  •  melatonin  •  ondansetron  •  [COMPLETED] Insert peripheral IV **AND** sodium chloride  •  sodium chloride    Assessment & Plan   Assessment & Plan     Active Hospital Problems    Diagnosis  POA   • **Junctional bradycardia [R00.1]  Unknown   • Elevated troponin [R77.8]  Yes   • Anemia [D64.9]  Yes   • Atrial fibrillation (HCC) [I48.91]  Yes   • Nausea and vomiting, unspecified vomiting type [R11.2]  Yes   • Essential hypertension [I10]  Yes   • Hyperlipidemia [E78.5]  Yes   • Type 2 diabetes mellitus (HCC) [E11.9]  Yes      Resolved Hospital Problems   No resolved problems to display.        Brief Hospital Course to date:  August Alex is a 82 y.o. male with history of atrial fibrillation on Eliquis, hypertension, hyperlipidemia, diabetes mellitus type 2 and BPH who presented with nausea and vomiting and was noted to have an elevated troponin.     Bradycardia  Elevated troponin  Atrial fibrillation  -Cardiology was  consulted.  Likely has asymptomatic mild sinus bradycardia.  Recently was evaluated by cardiologist at [facility] and they recommended monitoring.  No indication for PPM.  Elevated troponin likely secondary to cardiac stress in the setting of acute gastroenteritis.  -Echocardiogram shows normal EF of 60%     Nausea and vomiting  Lactic acidosis  -CT of the abdomen pelvis shows no acute disease  -Leukocytosis of 14,000 on presentation.  -Lactic acidosis resolved with fluids  -Continue antiemetics advance diet as tolerated  -      Mild acute kidney injury  Dehydration  Ketonuria  -Creatinine was 1.28 on presentation.  No prior baseline for comparison  -Awaiting BMP this morning  -Likely secondary to dehydration     Anemia  -Uncertain etiology  -Hemoglobin was noted to be 11.1 on presentation, trended down to 8.5.  Repeat CBC this a.m.  -Consider GI consult if trends down further.  May have been hemoconcentrated on presentation.  -Continue to monitor, transfuse for hemoglobin less than 7     Hypomagnesemia  -Replace per protocol     Diabetes mellitus type 2  -Hemoglobin A1c 6.00  -Holding oral agent  -continue Sliding scale insulin as needed     Hypertension  -On lisinopril 30 mg at home, consider resuming if creatinine stable     Hyperlipidemia  -No statin listed on home meds     Expected Discharge Location and Transportation: Home, private car  Expected Discharge Date: 10/20/2022      DVT prophylaxis:  Medical DVT prophylaxis orders are present.          CODE STATUS:   Code Status and Medical Interventions:   Ordered at: 10/15/22 5301     Level Of Support Discussed With:    Patient     Code Status (Patient has no pulse and is not breathing):    CPR (Attempt to Resuscitate)     Medical Interventions (Patient has pulse or is breathing):    Full Support     This patient's problems and plans were partially entered by my partner and updated as appropriate by me 10/17/22. Today is my first day evaluating this  patient's active medical problems. I Personally reviewed chart and adjusted note to reflect daily changes in management/clinical condition      Bobbi So MD  10/17/22

## 2022-10-17 NOTE — THERAPY EVALUATION
Patient Name: August Alex  : 1940    MRN: [id number]                              Today's Date: 10/17/2022       Admit Date: 10/15/2022    Visit Dx:     ICD-10-CM ICD-9-CM   1. Nausea and vomiting, unspecified vomiting type  R11.2 787.01   2. Diaphoresis  R61 780.8   3. Lactic acidosis  E87.20 276.2   4. Elevated troponin  R77.8 790.6   5. Flank pain  R10.9 789.09     Patient Active Problem List   Diagnosis   • Nausea and vomiting, unspecified vomiting type   • Essential hypertension   • Hyperlipidemia   • Type 2 diabetes mellitus (HCC)   • Elevated troponin   • Anemia   • Atrial fibrillation (HCC)   • Junctional bradycardia     No past medical history on file.  No past surgical history on file.   General Information     Row Name 10/17/22 [facility]6          Physical Therapy Time and Intention    Document Type evaluation  -     Mode of Treatment individual therapy;physical therapy  -     Row Name 10/17/22 [facility]8          General Information    Patient Profile Reviewed yes  -KW     Prior Level of Function independent:;community mobility;all household mobility;gait;transfer;bed mobility  -     Existing Precautions/Restrictions fall  -     Row Name 10/17/22 [facility]0          Home Main Entrance    Number of Stairs, Main Entrance one  -KW     Row Name 10/17/22 [facility]          Stairs Within Home, Primary    Number of Stairs, Within Home, Primary none  -     Row Name 10/17/22 [facility]4          Cognition    Orientation Status (Cognition) oriented x 4  -     Row Name 10/17/22 [facility]2          Safety Issues, Functional Mobility    Safety Issues Affecting Function (Mobility) awareness of need for assistance;insight into deficits/self-awareness  -     Impairments Affecting Function (Mobility) balance  -KW           User Key  (r) = Recorded By, (t) = Taken By, (c) = Cosigned By    Initials Name Provider Type    KW Yesenia Henning PT Physical Therapist               Mobility     Row Name 10/17/22 2832          Bed  Mobility    Bed Mobility supine-sit-supine  -KW     Supine-Sit-Supine [place] (Bed Mobility) independent  -KW     Row Name 10/17/22 1052          Gait/Stairs (Locomotion)    [place] Level (Gait) supervision  -KW     Distance in Feet (Gait) 300  -KW     Deviations/Abnormal Patterns (Gait) gait speed decreased;kandis decreased;other (see comments)  small LOB  -KW           User Key  (r) = Recorded By, (t) = Taken By, (c) = Cosigned By    Initials Name Provider Type    Yesenia Nunes PT Physical Therapist               Obj/Interventions    No documentation.                Goals/Plan     Row Name 10/17/22 1352          Problem Specific Goal 1 (PT)    Problem Specific Goal 1 (PT) Patient will score >50 on Bhat balance test to decrease fall risk  -KW     Time Frame (Problem Specific Goal 1, PT) long-term goal (LTG)  -KW           User Key  (r) = Recorded By, (t) = Taken By, (c) = Cosigned By    Initials Name Provider Type    Yesenia Nunes PT Physical Therapist               Clinical Impression     Row Name 10/17/22 8776          Pain    Pretreatment Pain Rating 3/10  -KW     Pain Location - Side/Orientation Right  -KW     Pain Location - knee;hip  -KW     Row Name 10/17/22 9530          Plan of Care Review    Plan of Care Reviewed With patient  -KW     Progress no change  -KW     Outcome Evaluation PT eval complete. Patient demonstrates independence with mobility however demonstrates decreased balance. He has had 3 falls at home, and demonstrated >5 LOB during PT assessment, 3 needing assist from PT to regain balance while standing on one leg to demonstrate movement to PT. Patient does ambulate with slowed kandis and small LOB/ pathway deviations with ambulation, but able to self correct LOB. Patient returned to room and sat at EOB to complete exercsies. Patient would benefit from skilled PT services to improve dynamic balance and decrease fall risk. Patient would benefit from outpatient  PT to continue balance training  -KW     Row Name 10/17/22 1350          Therapy Assessment/Plan (PT)    Rehab Potential (PT) good, to achieve stated therapy goals  -KW     Criteria for Skilled Interventions Met (PT) yes;skilled treatment is necessary  -KW     Therapy Frequency (PT) 5 times/wk  -KW     Row Name 10/17/22 7810          Positioning and Restraints    Pre-Treatment Position in bed  -KW     Post Treatment Position bed  -KW     In Bed supine;call light within reach;encouraged to call for assist  -KW           User Key  (r) = Recorded By, (t) = Taken By, (c) = Cosigned By    Initials Name Provider Type    KW Yesenia Henning, PT Physical Therapist               Outcome Measures     Row Name 10/17/22 9729          How much help from another person do you currently need...    Turning from your back to your side while in flat bed without using bedrails? 4  -KW     Moving from lying on back to sitting on the side of a flat bed without bedrails? 4  -KW     Moving to and from a bed to a chair (including a wheelchair)? 4  -KW     Standing up from a chair using your arms (e.g., wheelchair, bedside chair)? 4  -KW     Climbing 3-5 steps with a railing? 4  -KW     To walk in hospital room? 3  -KW     AM-PAC 6 Clicks Score (PT) 23  -KW     Highest level of mobility 7 --> Walked 25 feet or more  -KW     Row Name 10/17/22 [facility]8          Functional Assessment    Outcome Measure Options AM-PAC 6 Clicks Basic Mobility (PT)  -KW     Row Name 10/17/22 [facility]0          Bhat Balance Scale    Sitting to Standing 3  -KW     Standing Unsupported 4  -KW     Sitting with Back Unsupported but Feet Supported on Floor or on Stool 4  -KW     Standing to Sitting 4  -KW     Transfers 4  -KW     Standing Unsupported with Eyes Closed 4  -KW     Standing Unsupported with Feet Together 4  -KW     Reaching Forward with Outstretched Arm While Standing 2  -KW      Object From the Floor From a Standing Position 4  -KW     Turning to Look  Behind Over Left and Right Shoulders While Standing 4  -KW     Turn 360 Degrees 4  -KW     Place Alternate Foot on Step or Stool While Standing Unsupported 3  -KW     Standing Unsupported with One Foot in Front 1  -KW     Standing on One Leg 2  -KW     Bhat Total Score 47  -KW           User Key  (r) = Recorded By, (t) = Taken By, (c) = Cosigned By    Initials Name Provider Type    Yesenia Nunes PT Physical Therapist                             Physical Therapy Education     Title: PT OT SLP Therapies (Done)     Topic: Physical Therapy (Done)     Point: Mobility training (Done)     Learning Progress Summary           Patient Acceptance, E,TB, VU by  at 10/17/2022 1355    Comment: patient edcuated about benefit of PT to improve dynamic balance                   Point: Home exercise program (Done)     Learning Progress Summary           Patient Acceptance, E,TB, VU by  at 10/17/2022 1355    Comment: patient edcuated about benefit of PT to improve dynamic balance                   Point: Body mechanics (Done)     Learning Progress Summary           Patient Acceptance, E,TB, VU by  at 10/17/2022 1355    Comment: patient edcuated about benefit of PT to improve dynamic balance                   Point: Precautions (Done)     Learning Progress Summary           Patient Acceptance, E,TB, VU by  at 10/17/2022 1355    Comment: patient edcuated about benefit of PT to improve dynamic balance                               User Key     Initials Effective Dates Name Provider Type Discipline     01/27/22 -  Yesenia Henning PT Physical Therapist PT              PT Recommendation and Plan     Plan of Care Reviewed With: patient  Progress: no change  Outcome Evaluation: PT eval complete. Patient demonstrates independence with mobility however demonstrates decreased balance. He has had 3 falls at home, and demonstrated >5 LOB during PT assessment, 3 needing assist from PT to regain balance while standing on  one leg to demonstrate movement to PT. Patient does ambulate with slowed kandis and small LOB/ pathway deviations with ambulation, but able to self correct LOB. Patient returned to room and sat at EOB to complete exercsies. Patient would benefit from skilled PT services to improve dynamic balance and decrease fall risk. Patient would benefit from outpatient PT to continue balance training     Time Calculation:    PT Charges     Row Name 10/17/22 1355             Time Calculation    Start Time 1355  -KW      PT Received On 10/17/22  -KW      PT Goal Re-Cert Due Date 10/27/22  -KW         Timed Charges    67399 - PT Therapeutic Exercise Minutes 10  -KW         Untimed Charges    PT Eval/Re-eval Minutes 41  -KW         Total Minutes    Timed Charges Total Minutes 10  -KW      Untimed Charges Total Minutes 41  -KW       Total Minutes 51  -KW            User Key  (r) = Recorded By, (t) = Taken By, (c) = Cosigned By    Initials Name Provider Type    Yesenia Nunes, MIKHAIL Physical Therapist              Therapy Charges for Today     Code Description Service Date Service Provider Modifiers Qty    01222662794 HC PT EVAL MOD COMPLEXITY 3 10/17/2022 Yesenia Henning, PT GP 1    11875054004  PT THER PROC EA 15 MIN 10/17/2022 Yesenia Henning PT GP 1          PT G-Codes  Outcome Measure Options: AM-PAC 6 Clicks Basic Mobility (PT)  AM-PAC 6 Clicks Score (PT): 23  Bhat Total Score: 47    Yesenia Henning PT  10/17/2022

## 2022-10-17 NOTE — PROGRESS NOTES
Malnutrition Severity Assessment    Patient Name:  August Alex  YOB: 1940  MRN: [id number]  Admit Date:  10/15/2022    Patient meets criteria for : Moderate (non-severe) Malnutrition (Pt meets criteria for non severe chronic malnutrition based on wt loss w wasting.)    Comments:      Malnutrition Severity Assessment  Malnutrition Type: Chronic Disease - Related Malnutrition  Malnutrition Type (last 8 hours)     Malnutrition Severity Assessment     Row Name 10/16/22 2358       Malnutrition Severity Assessment    Malnutrition Type Chronic Disease - Related Malnutrition    Row Name 10/16/22 2358       Insufficient Energy Intake     Insufficient Energy Intake Findings --  unable to quantify Period of N/V    Row Name 10/16/22 2358       Unintentional Weight Loss     Unintentional Weight Loss Findings Severe    Unintentional Weight Loss  Weight loss greater than 7.5% in three months    Row Name 10/16/22 2358       Muscle Loss    Loss of Muscle Mass Findings Moderate    [place] Region Moderate - slight depression    Clavicle Bone Region --  mild    Acromion Bone Region Moderate - acromion may slightly protrude    Scapular Bone Region Moderate - mild depression, bones may show slightly    Dorsal Hand Region Moderate - slight depression    Patellar Region --  mild    Anterior Thigh Region --  mild    Posterior Calf Region Moderate - some roundness, slight firmness    Row Name 10/16/22 2358       Fat Loss    Subcutaneous Fat Loss Findings Mild    Orbital Region  --  mild    Upper Arm Region --  mild    Thoracic & Lumbar Region Moderate - ribs visible with mild depressions, iliac crest somewhat prominent    Row Name 10/16/22 2358       Criteria Met (Must meet criteria for severity in at least 2 of these categories: M Wasting, Fat Loss, Fluid, Secondary Signs, Wt. Status, Intake)    Patient meets criteria for  Moderate (non-severe) Malnutrition  Pt meets criteria for non severe chronic malnutrition based on  wt loss w wasting.                Electronically signed by:  Myrna Tse RD  10/17/22 00:12 EDT

## 2022-10-17 NOTE — PROGRESS NOTES
"                  Clinical Nutrition     Nutrition Assessment  Reason for Visit:   Malnutrition Severity Assessment      Patient Name: August Alex  YOB: 1940  MRN: [id number]  Date of Encounter: 10/17/22 00:04 EDT  Admission date: 10/15/2022      Comments: Pt meets criteria for non severe chronic malnutrition based on wt loss w wasting.  See flowsheet note.      Admission Diagnosis    Nausea and vomiting, unspecified vomiting type [R11.2]     Hospital Problem List    Junctional bradycardia    Nausea and vomiting, unspecified vomiting type    Essential hypertension    Hyperlipidemia    Type 2 diabetes mellitus (HCC)    Elevated troponin    Anemia    Atrial fibrillation (HCC)      Other Applicable:     Applicable Interval History:      Applicable PMH/PSxH:     PMH: He  has no past medical history on file.   PSxH: He  has no past surgical history on file.         Diet/Nutrition Related History:     Pt allow despite eating ok prior to illness was losing wt. Felt it was muscle loss.       Labs reviewed   Yes  Replacing Mg  Results from last 7 days   Lab Units 10/16/22  0206 10/15/22  2145   GLUCOSE mg/dL 155* 132*   BUN mg/dL 30* 30*   CREATININE mg/dL 1.28* 1.24   SODIUM mmol/L 143 142   CHLORIDE mmol/L 110* 106   POTASSIUM mmol/L 4.0 4.5   MAGNESIUM mg/dL 1.3*  --    ALT (SGPT) U/L 11 15     Results from last 7 days   Lab Units 10/16/22  0206 10/15/22  2145   ALBUMIN g/dL 3.40* 4.30   CHOLESTEROL mg/dL 71  --    TRIGLYCERIDES mg/dL 47  --          Lab 10/16/22  0206 10/15/22  2234 10/15/22  2113   LACTATE 1.7 2.7* 3.8*        Results from last 7 days   Lab Units 10/16/22  1949 10/16/22  1640 10/16/22  1106 10/16/22  0710   GLUCOSE mg/dL 120 161* 170* 159*       Lab Results   Lab Value Date/Time    HGBA1C 6.00 (H) 10/16/2022 0206       Medications reviewed   Yes  Insulin    Intake/Ouptut 24 hrs reviewed   Yes      Anthropometrics     Admission Height 175.3 cm (69\") Documented at 10/15/2022 2023 " "  Admission Weight 63.5 kg (140 lb) Documented at 10/15/2022 2023       Height: 175.3 cm (69\")    Last filed wt: Weight: 64.4 kg (142 lb) (10/16/22 0030)  Weight Method: Standing scale    BMI: BMI (Calculated): 21  Normal: 18.5-24.9kg/m2    Ideal Body Weight (IBW) (kg): 73.69    Weight Change   UBW: 155 lbs (EMR on 8/9/22)  Weight change:13 lbs  % wt change: 8%  Time frame of weight loss: 2 mo      Nutrition Focused Physical Exam  Date: 10/16     Pt meets criteria for non severe chronic malnutrition based on wt loss w wasting.  See flowsheet note.      Current Nutrition Prescription     PO: Diet Regular; Consistent Carbohydrate  Orders Placed This Encounter      Dietary Nutrition Supplements Boost Glucose Control  daily    Intake: 83% x 3 meals      Nutrition Diagnosis     10/16  Problem Malnutrition  non severe chronic   Etiology Effect illness on energy needs    Signs/Symptoms Wt loss w wasting   Status:      Goal:   General: Nutrition to support treatment  PO: Maintain intake  Additional goals:      Nutrition Intervention     Follow treatment progress, Care plan reviewed, Advise alternate selection, Menu provided, Menu adjusted      Monitoring/Evaluation:   Per protocol, PO intake, Supplement intake, Pertinent labs, Weight, Symptoms        Myrna Tse RD,   Time Spent: 30 min      "

## 2022-10-18 VITALS
DIASTOLIC BLOOD PRESSURE: 82 MMHG | WEIGHT: 142 LBS | RESPIRATION RATE: 16 BRPM | OXYGEN SATURATION: 97 % | SYSTOLIC BLOOD PRESSURE: 186 MMHG | TEMPERATURE: 97.3 F | HEART RATE: 55 BPM | HEIGHT: 69 IN | BODY MASS INDEX: 21.03 KG/M2

## 2022-10-18 PROBLEM — R77.8 ELEVATED TROPONIN: Status: RESOLVED | Noted: 2022-10-16 | Resolved: 2022-10-18

## 2022-10-18 PROBLEM — R11.2 NAUSEA AND VOMITING, UNSPECIFIED VOMITING TYPE: Status: RESOLVED | Noted: 2022-10-15 | Resolved: 2022-10-18

## 2022-10-18 PROBLEM — E44.0 MODERATE MALNUTRITION (HCC): Status: ACTIVE | Noted: 2022-10-18

## 2022-10-18 PROBLEM — R79.89 ELEVATED TROPONIN: Status: RESOLVED | Noted: 2022-10-16 | Resolved: 2022-10-18

## 2022-10-18 PROBLEM — R00.1 BRADYCARDIA: Status: ACTIVE | Noted: 2022-10-18

## 2022-10-18 LAB
ANION GAP SERPL CALCULATED.3IONS-SCNC: 10 MMOL/L (ref 5–15)
BUN SERPL-MCNC: 26 MG/DL (ref 8–23)
BUN/CREAT SERPL: 24.3 (ref 7–25)
CALCIUM SPEC-SCNC: 8.9 MG/DL (ref 8.6–10.5)
CHLORIDE SERPL-SCNC: 107 MMOL/L (ref 98–107)
CO2 SERPL-SCNC: 22 MMOL/L (ref 22–29)
CREAT SERPL-MCNC: 1.07 MG/DL (ref 0.76–1.27)
DEPRECATED RDW RBC AUTO: 48.1 FL (ref 37–54)
EGFRCR SERPLBLD CKD-EPI 2021: 70.2 ML/MIN/1.73
ERYTHROCYTE [DISTWIDTH] IN BLOOD BY AUTOMATED COUNT: 14.9 % (ref 12.3–15.4)
GLUCOSE BLDC GLUCOMTR-MCNC: 206 MG/DL (ref 70–130)
GLUCOSE SERPL-MCNC: 196 MG/DL (ref 65–99)
HCT VFR BLD AUTO: 27.7 % (ref 37.5–51)
HGB BLD-MCNC: 9 G/DL (ref 13–17.7)
MAGNESIUM SERPL-MCNC: 1.7 MG/DL (ref 1.6–2.4)
MCH RBC QN AUTO: 28.9 PG (ref 26.6–33)
MCHC RBC AUTO-ENTMCNC: 32.5 G/DL (ref 31.5–35.7)
MCV RBC AUTO: 89.1 FL (ref 79–97)
PLATELET # BLD AUTO: 109 10*3/MM3 (ref 140–450)
PMV BLD AUTO: 11.9 FL (ref 6–12)
POTASSIUM SERPL-SCNC: 4.2 MMOL/L (ref 3.5–5.2)
RBC # BLD AUTO: 3.11 10*6/MM3 (ref 4.14–5.8)
SODIUM SERPL-SCNC: 139 MMOL/L (ref 136–145)
WBC NRBC COR # BLD: 3.66 10*3/MM3 (ref 3.4–10.8)

## 2022-10-18 PROCEDURE — 99217 PR OBSERVATION CARE DISCHARGE MANAGEMENT: CPT | Performed by: INTERNAL MEDICINE

## 2022-10-18 PROCEDURE — 83735 ASSAY OF MAGNESIUM: CPT | Performed by: INTERNAL MEDICINE

## 2022-10-18 PROCEDURE — 82962 GLUCOSE BLOOD TEST: CPT

## 2022-10-18 PROCEDURE — 80048 BASIC METABOLIC PNL TOTAL CA: CPT | Performed by: INTERNAL MEDICINE

## 2022-10-18 PROCEDURE — 63710000001 INSULIN LISPRO (HUMAN) PER 5 UNITS: Performed by: PHYSICIAN ASSISTANT

## 2022-10-18 PROCEDURE — 85027 COMPLETE CBC AUTOMATED: CPT | Performed by: INTERNAL MEDICINE

## 2022-10-18 PROCEDURE — G0378 HOSPITAL OBSERVATION PER HR: HCPCS

## 2022-10-18 RX ADMIN — INSULIN LISPRO 4 UNITS: 100 INJECTION, SOLUTION INTRAVENOUS; SUBCUTANEOUS at 08:31

## 2022-10-18 RX ADMIN — APIXABAN 5 MG: 5 TABLET, FILM COATED ORAL at 08:31

## 2022-10-18 RX ADMIN — Medication 10 ML: at 09:19

## 2022-10-18 NOTE — CASE MANAGEMENT/SOCIAL WORK
"Continued Stay Note  [facility]     Patient Name: August Alex  MRN: [id number]  Today's Date: 10/18/2022    Admit Date: 10/15/2022    Plan: Home   Discharge Plan     Row Name 10/18/22 1049       Plan    Plan Home    Patient/Family in Agreement with Plan yes    Plan Comments Mr Alex is being DC today. Per therapy, they rec outpatient PT. I asked him if he was interested in outpatient therapy. He stated that he'd rather think about it. Says he has exercises from \"when he had therapy before.\" Advised him to ask his PCP to refer him to outpatient therapy if he changes his mind. Plan is home with his sister and his neighbor will transport home.    Final Discharge Disposition Code 01 - home or self-care               Discharge Codes    No documentation.               Expected Discharge Date and Time     Expected Discharge Date Expected Discharge Time    Oct 18, 2022             Bobbi Davidson RN    "

## 2022-10-18 NOTE — DISCHARGE SUMMARY
[facility] Medicine Services  DISCHARGE SUMMARY    Patient Name: August Alex  : 1940  MRN: [id number]    Date of Admission: 10/15/2022  8:16 PM  Date of Discharge:  10/18/22  Primary Care Physician: Candelario Ayon MD    Consults     Date and Time Order Name Status Description    10/16/2022 12:33 AM Inpatient Cardiology Consult Completed           Hospital Course     Presenting Problem:   Nausea and vomiting, unspecified vomiting type [R11.2]    Active Hospital Problems    Diagnosis  POA   • Moderate malnutrition (HCC) [E44.0]  Yes   • Bradycardia [R00.1]  No   • Anemia [D64.9]  Yes   • Atrial fibrillation (HCC) [I48.91]  Yes   • Essential hypertension [I10]  Yes   • Hyperlipidemia [E78.5]  Yes   • Type 2 diabetes mellitus (HCC) [E11.9]  Yes      Resolved Hospital Problems    Diagnosis Date Resolved POA   • Elevated troponin [R77.8] 10/18/2022 Yes   • Nausea and vomiting, unspecified vomiting type [R11.2] 10/18/2022 Yes          Hospital Course:  August Alex is a 82 y.o. male with history of atrial fibrillation on Eliquis, hypertension, hyperlipidemia, diabetes mellitus type 2 and BPH who presented with nausea and vomiting and was noted to have an elevated troponin. CT of the abdomen pelvis showed no acute disease.  He had a leukocytosis of 14,000 on presentation. He also had a lactic acidosis that resolved with fluids. He was started on antiemetics and fluids. Creatinine went up to 1.28 during admission and has returned to normal.     During admission he was noted to have bradycardia and elevated troponins.  Cardiology was consulted and felt like this was asymptomatic sinus bradycardia.  Recommended continuing Eliquis.  Echocardiogram showed normal EF of 60%.    Nausea and vomiting has improved.  Patient is back to baseline.  He is appropriate for discharge home with home physical therapy      Discharge Follow Up Recommendations for outpatient  labs/diagnostics:  Follow-up with PCP in 2 weeks    Day of Discharge     HPI:   Doing well this morning.  Tolerating diet.  Nausea vomiting resolved.  Hopeful to go home.    Review of Systems  General: denies fevers or chills  CV: denies chest pain  Resp: denies shortness of breath  Abd: denies abd pain, nausea      Vital Signs:   Temp:  [97.6 °F (36.4 °C)-98.6 °F (37 °C)] 97.7 °F (36.5 °C)  Heart Rate:  [40-79] 65  Resp:  [17-20] 17  BP: (155-193)/(49-87) 167/62      Physical Exam:  Constitutional: No acute distress, awake, alert, eating breakfast  Respiratory: Clear to auscultation bilaterally, respiratory effort normal   Cardiovascular: RRR, no murmurs, rubs, or gallops  Gastrointestinal: Positive bowel sounds, soft, nontender, nondistended  Musculoskeletal: No bilateral ankle edema  Psychiatric: Appropriate affect, cooperative  Neurologic: Oriented x 3, no focal deficits        Pertinent  and/or Most Recent Results     LAB RESULTS:      Lab 10/18/22  0311 10/16/22  0206 10/15/22  2234 10/15/22  2113   WBC 3.66 9.14  --  14.55*   HEMOGLOBIN 9.0* 8.5*  --  11.1*   HEMATOCRIT 27.7* 26.3*  --  34.3*   PLATELETS 109* 117*  --  152   NEUTROS ABS  --  7.62*  --  12.82*   IMMATURE GRANS (ABS)  --  0.05  --  0.06*   LYMPHS ABS  --  0.68*  --  0.60*   MONOS ABS  --  0.75  --  0.99*   EOS ABS  --  0.03  --  0.07   MCV 89.1 90.1  --  89.1   LACTATE  --  1.7 2.7* 3.8*         Lab 10/18/22  0311 10/16/22  0206 10/15/22  2145   SODIUM 139 143 142   POTASSIUM 4.2 4.0 4.5   CHLORIDE 107 110* 106   CO2 22.0 23.0 23.0   ANION GAP 10.0 10.0 13.0   BUN 26* 30* 30*   CREATININE 1.07 1.28* 1.24   EGFR 70.2 56.6* 58.8*   GLUCOSE 196* 155* 132*   CALCIUM 8.9 9.3 10.4   MAGNESIUM 1.7 1.3*  --    HEMOGLOBIN A1C  --  6.00*  --    TSH  --  1.510  --          Lab 10/16/22  0206 10/15/22  2145   TOTAL PROTEIN 5.5* 6.9   ALBUMIN 3.40* 4.30   GLOBULIN 2.1 2.6   ALT (SGPT) 11 15   AST (SGOT) 17 26   BILIRUBIN 0.4 0.5   ALK PHOS 81 108   LIPASE   --  19         Lab 10/16/22  0517 10/16/22  0206 10/15/22  2234 10/15/22  2145   TROPONIN T 0.033* 0.034* 0.033* 0.029         Lab 10/16/22  0206   CHOLESTEROL 71   LDL CHOL 22   HDL CHOL 36*   TRIGLYCERIDES 47             Brief Urine Lab Results  (Last result in the past 365 days)      Color   Clarity   Blood   Leuk Est   Nitrite   Protein   CREAT   Urine HCG        10/15/22 2229 Dark Yellow   Clear   Negative   Small (1+)   Negative   30 mg/dL (1+)               Microbiology Results (last 10 days)     Procedure Component Value - Date/Time    COVID PRE-OP / PRE-PROCEDURE SCREENING ORDER (NO ISOLATION) - Swab, Nasal Cavity [[id number]]  (Normal) Collected: 10/15/22 2053    Lab Status: Final result Specimen: Swab from Nasal Cavity Updated: 10/15/22 2137    Narrative:      The following orders were created for panel order COVID PRE-OP / PRE-PROCEDURE SCREENING ORDER (NO ISOLATION) - Swab, Nasal Cavity.  Procedure                               Abnormality         Status                     ---------                               -----------         ------                     COVID-19 and FLU A/B PCR...[[id number]]  Normal              Final result                 Please view results for these tests on the individual orders.    COVID-19 and FLU A/B PCR - Swab, Nasopharynx [[id number]]  (Normal) Collected: 10/15/22 2053    Lab Status: Final result Specimen: Swab from Nasopharynx Updated: 10/15/22 2137     COVID19 Not Detected     Influenza A PCR Not Detected     Influenza B PCR Not Detected    Narrative:      Fact sheet for providers: https://www.fda.gov/media/605427/download    Fact sheet for patients: https://www.fda.gov/media/079898/download    Test performed by PCR.          Adult Transthoracic Echo Complete W/ Cont if Necessary Per Protocol    Result Date: 10/16/2022  •  Left ventricular systolic function is normal. stimated left ventricular EF = 60%. •  Aortic valve is sclerotic no stenosis or regurgitation •   Estimated right ventricular systolic pressure from tricuspid regurgitation is normal (<35 mmHg).     CT Abdomen Pelvis Without Contrast    Result Date: 10/15/2022  EXAM: CT OF THE ABDOMEN AND PELVIS WITHOUT IV CONTRAST INDICATIONS: Generalized abdominal pain, nausea, vomiting, diarrhea TECHNIQUE: CT of the abdomen and pelvis was performed without the administration of intravenous or oral contrast. CT dose lowering techniques were used, to include: automated exposure control, adjustment for patient size, and / or use of iterative reconstruction. COMPARISON: No prior abdominal or pelvic CTs are in the system. FINDINGS: Bones: No destructive osseous lesions. Lung bases: Unremarkable ABDOMEN: Liver: Unremarkable in noncontrast appearance. Gallbladder and Bile Ducts: Unremarkable CT appearance of the gallbladder. There is no intrahepatic or extrahepatic biliary ductal dilatation. Spleen: Unremarkable in noncontrast appearance. Pancreas: The pancreatic parenchyma is unremarkable. There is no pancreatic ductal dilatation. Adrenals: Unremarkable without nodularity. Kidneys: There is no hydroureteronephrosis. Nonobstructing stones are present in each kidney. Vasculature: There are atherosclerotic calcifications throughout the abdomen and pelvis, without aneurysm. Nodes: There is no lymphadenopathy by size criteria. Bowel: There is no bowel obstruction. The right hemicolon is distended. The left hemicolon is collapsed. The appendix is not identified, however there are no inflammatory changes at the cecal base. Mesentery/Peritoneum: Unremarkable Soft Tissues: Unremarkable PELVIS: Pelvic Organs: The prostate gland is enlarged and indents the bladder base. The urinary bladder is unremarkable.     1. No evidence of acute disease in the abdomen or pelvis, accounting for the limitations of the noncontrast technique. 2. Nonobstructing stones in each kidney. 3. Enlarged prostate. 4. Additional chronic findings as above.  Electronically signed by:  Donis Carranza M.D.  10/15/2022 7:51 PM Mountain Time              Results for orders placed during the hospital encounter of 10/15/22    Adult Transthoracic Echo Complete W/ Cont if Necessary Per Protocol    Interpretation Summary  •  Left ventricular systolic function is normal. stimated left ventricular EF = 60%.  •  Aortic valve is sclerotic no stenosis or regurgitation  •  Estimated right ventricular systolic pressure from tricuspid regurgitation is normal (<35 mmHg).      Plan for Follow-up of Pending Labs/Results:     Discharge Details        Discharge Medications      Continue These Medications      Instructions Start Date   apixaban 5 MG tablet tablet  Commonly known as: ELIQUIS   5 mg, Oral, 2 Times Daily      glipizide 5 MG ER tablet  Commonly known as: GLUCOTROL XL   5 mg, Oral, Daily      lisinopril 40 MG tablet  Commonly known as: PRINIVIL,ZESTRIL   30 mg, Oral, Daily             No Known Allergies      Discharge Disposition:      Diet:  Hospital:  Diet Order   Procedures   • Diet Regular; Consistent Carbohydrate       Activity:  Activity Instructions     Activity as Tolerated            Restrictions or Other Recommendations:         CODE STATUS:    Code Status and Medical Interventions:   Ordered at: 10/15/22 0459     Level Of Support Discussed With:    Patient     Code Status (Patient has no pulse and is not breathing):    CPR (Attempt to Resuscitate)     Medical Interventions (Patient has pulse or is breathing):    Full Support       No future appointments.    Additional Instructions for the Follow-ups that You Need to Schedule     Discharge Follow-up with PCP   As directed       Currently Documented PCP:    Candelario Ayon MD    PCP Phone Number:    [phone]     Follow Up Details: 2 weeks                     Bobbi So MD  10/18/22      Time Spent on Discharge:  I spent  35  minutes on this discharge activity which included: face-to-face encounter with the  patient, reviewing the data in the system, coordination of the care with the nursing staff as well as consultants, documentation, and entering orders.

## 2025-07-07 ENCOUNTER — TELEPHONE (OUTPATIENT)
Dept: CARDIOLOGY | Facility: CLINIC | Age: 85
End: 2025-07-07
Payer: MEDICARE

## 2025-07-07 NOTE — TELEPHONE ENCOUNTER
Attempted to complete medication reconciliation prior to patients appointment. The only number on file for the patient is for his sister Jaimee, and is no longer an active number.

## 2025-07-16 ENCOUNTER — TELEPHONE (OUTPATIENT)
Dept: CARDIOLOGY | Facility: CLINIC | Age: 85
End: 2025-07-16
Payer: MEDICARE

## 2025-07-16 NOTE — TELEPHONE ENCOUNTER
Called to follow-up with patient regarding no show with Dr. Jacobs on 07/08/2025 . Unable to reach patient. Number on file not valid.    If patient returns call please transfer to [phone].